# Patient Record
Sex: FEMALE | NOT HISPANIC OR LATINO | Employment: UNEMPLOYED
[De-identification: names, ages, dates, MRNs, and addresses within clinical notes are randomized per-mention and may not be internally consistent; named-entity substitution may affect disease eponyms.]

---

## 2019-09-12 ENCOUNTER — TELEPHONE (OUTPATIENT)
Dept: SCHEDULING | Age: 29
End: 2019-09-12

## 2019-09-12 ENCOUNTER — WALK IN (OUTPATIENT)
Dept: URGENT CARE | Age: 29
End: 2019-09-12

## 2019-09-12 DIAGNOSIS — J02.9 PHARYNGITIS, UNSPECIFIED ETIOLOGY: Primary | ICD-10-CM

## 2019-09-12 LAB
INTERNAL PROCEDURAL CONTROLS ACCEPTABLE: YES
S PYO AG THROAT QL IA.RAPID: NEGATIVE

## 2019-09-12 PROCEDURE — X0943 POCT RAPID STREP A: HCPCS | Performed by: NURSE PRACTITIONER

## 2019-09-12 RX ORDER — LORATADINE 10 MG/1
10 TABLET ORAL DAILY
COMMUNITY

## 2019-09-12 RX ORDER — MONTELUKAST SODIUM 10 MG/1
10 TABLET ORAL NIGHTLY
COMMUNITY

## 2019-09-12 SDOH — HEALTH STABILITY: MENTAL HEALTH: HOW OFTEN DO YOU HAVE A DRINK CONTAINING ALCOHOL?: NEVER

## 2019-09-12 ASSESSMENT — ENCOUNTER SYMPTOMS
COUGH: 1
LIGHT-HEADEDNESS: 0
WEAKNESS: 0
FEVER: 1
SHORTNESS OF BREATH: 0
TROUBLE SWALLOWING: 0
WHEEZING: 0
FATIGUE: 0
CHILLS: 0
CHEST TIGHTNESS: 0
HEADACHES: 1
SORE THROAT: 1
DIZZINESS: 0
NUMBNESS: 0

## 2019-09-12 ASSESSMENT — PAIN SCALES - GENERAL: PAINLEVEL: 7-8

## 2019-09-13 ENCOUNTER — TELEPHONE (OUTPATIENT)
Dept: SCHEDULING | Age: 29
End: 2019-09-13

## 2019-10-27 ENCOUNTER — HOSPITAL ENCOUNTER (EMERGENCY)
Facility: HOSPITAL | Age: 29
Discharge: HOME | End: 2019-10-27
Attending: EMERGENCY MEDICINE

## 2019-10-27 ENCOUNTER — APPOINTMENT (OUTPATIENT)
Dept: CT IMAGING | Facility: HOSPITAL | Age: 29
End: 2019-10-27
Attending: EMERGENCY MEDICINE

## 2019-10-27 VITALS
HEART RATE: 88 BPM | DIASTOLIC BLOOD PRESSURE: 78 MMHG | OXYGEN SATURATION: 99 % | WEIGHT: 130 LBS | BODY MASS INDEX: 26.21 KG/M2 | HEIGHT: 59 IN | SYSTOLIC BLOOD PRESSURE: 122 MMHG | RESPIRATION RATE: 16 BRPM | TEMPERATURE: 98 F

## 2019-10-27 DIAGNOSIS — R10.9 ABDOMINAL PAIN OF UNKNOWN ETIOLOGY: Primary | ICD-10-CM

## 2019-10-27 PROCEDURE — 96375 TX/PRO/DX INJ NEW DRUG ADDON: CPT

## 2019-10-27 PROCEDURE — 85025 COMPLETE CBC W/AUTO DIFF WBC: CPT | Performed by: EMERGENCY MEDICINE

## 2019-10-27 PROCEDURE — 83690 ASSAY OF LIPASE: CPT | Performed by: EMERGENCY MEDICINE

## 2019-10-27 PROCEDURE — 81025 URINE PREGNANCY TEST: CPT

## 2019-10-27 PROCEDURE — 96374 THER/PROPH/DIAG INJ IV PUSH: CPT

## 2019-10-27 PROCEDURE — 74176 CT ABD & PELVIS W/O CONTRAST: CPT | Performed by: EMERGENCY MEDICINE

## 2019-10-27 PROCEDURE — 96361 HYDRATE IV INFUSION ADD-ON: CPT

## 2019-10-27 PROCEDURE — 99284 EMERGENCY DEPT VISIT MOD MDM: CPT

## 2019-10-27 PROCEDURE — 80053 COMPREHEN METABOLIC PANEL: CPT | Performed by: EMERGENCY MEDICINE

## 2019-10-27 RX ORDER — ONDANSETRON 2 MG/ML
4 INJECTION INTRAMUSCULAR; INTRAVENOUS ONCE
Status: DISCONTINUED | OUTPATIENT
Start: 2019-10-27 | End: 2019-10-27

## 2019-10-27 RX ORDER — SODIUM CHLORIDE 9 MG/ML
INJECTION, SOLUTION INTRAVENOUS CONTINUOUS
Status: DISCONTINUED | OUTPATIENT
Start: 2019-10-27 | End: 2019-10-27

## 2019-10-27 RX ORDER — MONTELUKAST SODIUM 10 MG/1
10 TABLET ORAL NIGHTLY
COMMUNITY
End: 2020-02-05

## 2019-10-27 RX ORDER — ONDANSETRON 2 MG/ML
4 INJECTION INTRAMUSCULAR; INTRAVENOUS ONCE
Status: COMPLETED | OUTPATIENT
Start: 2019-10-27 | End: 2019-10-27

## 2019-10-27 RX ORDER — MORPHINE SULFATE 4 MG/ML
4 INJECTION, SOLUTION INTRAMUSCULAR; INTRAVENOUS EVERY 30 MIN PRN
Status: DISCONTINUED | OUTPATIENT
Start: 2019-10-27 | End: 2019-10-27

## 2019-10-27 NOTE — ED INITIAL ASSESSMENT (HPI)
27 yo F complaining of lower abdomen pain radiates to left back x 5 days. intermittent fever, Nausea, vomiting, some loose stools x 5 days.

## 2019-10-28 NOTE — ED PROVIDER NOTES
Patient Seen in: [facility] Emergency Department      History   Patient presents with:  Nausea/Vomiting/Diarrhea (gastrointestinal)  Abdomen/Flank Pain (GI/)    Stated Complaint: abd pain, + fever, yes n/v/d    HPI    Patient is a 26-year-old fema exudate. NECK: Supple, trachea midline, no lymphadenopathy. LUNG: Lungs clear to auscultation bilaterally, no wheezing, no rales, no rhonchi. CARDIOVASCULAR: Regular rate and rhythm. Normal S1S2. No S3S4 or murmur. ABDOMEN: Bowel sounds are present. 2d Rndr(no Iv,no Oral)(cpt=74176)    Result Date: 10/27/2019  PROCEDURE:  CT ABDOMEN/PELVIS KIDNEYSTONE 2D RNDR (NO IV,NO ORAL) (CPT=74176)  COMPARISON:  None.   INDICATIONS:  abd pain, + fever, yes n/v/d  TECHNIQUE:  Unenhanced multislice CT scanning from medication. Abdomen reexamined and benign. CT scan shows no evidence for appendicitis, kidney stones, ovarian cyst.  Patient will be sent home with [place] for a few days. Advised to follow with medicine as an outpatient.             Patient was screened a

## 2020-01-17 ENCOUNTER — OFFICE VISIT (OUTPATIENT)
Dept: OBGYN CLINIC | Facility: CLINIC | Age: 30
End: 2020-01-17

## 2020-01-17 ENCOUNTER — APPOINTMENT (OUTPATIENT)
Dept: LAB | Age: 30
End: 2020-01-17
Attending: NURSE PRACTITIONER
Payer: COMMERCIAL

## 2020-01-17 VITALS
HEIGHT: 59 IN | WEIGHT: 145 LBS | SYSTOLIC BLOOD PRESSURE: 114 MMHG | TEMPERATURE: 99 F | HEART RATE: 96 BPM | BODY MASS INDEX: 29.23 KG/M2 | RESPIRATION RATE: 16 BRPM | DIASTOLIC BLOOD PRESSURE: 82 MMHG

## 2020-01-17 DIAGNOSIS — R10.9 CRAMPING AFFECTING PREGNANCY, ANTEPARTUM: ICD-10-CM

## 2020-01-17 DIAGNOSIS — O26.859 SPOTTING IN EARLY PREGNANCY: Primary | ICD-10-CM

## 2020-01-17 DIAGNOSIS — O26.899 CRAMPING AFFECTING PREGNANCY, ANTEPARTUM: ICD-10-CM

## 2020-01-17 DIAGNOSIS — O26.859 SPOTTING IN EARLY PREGNANCY: ICD-10-CM

## 2020-01-17 DIAGNOSIS — N91.1 AMENORRHEA, SECONDARY: ICD-10-CM

## 2020-01-17 LAB
B-HCG SERPL-ACNC: 5661 MIU/ML
PROGEST SERPL-MCNC: 15.8 NG/ML
RH BLOOD TYPE: POSITIVE

## 2020-01-17 PROCEDURE — 86901 BLOOD TYPING SEROLOGIC RH(D): CPT

## 2020-01-17 PROCEDURE — 84144 ASSAY OF PROGESTERONE: CPT

## 2020-01-17 PROCEDURE — 84702 CHORIONIC GONADOTROPIN TEST: CPT

## 2020-01-17 PROCEDURE — 36415 COLL VENOUS BLD VENIPUNCTURE: CPT

## 2020-01-17 PROCEDURE — 86900 BLOOD TYPING SEROLOGIC ABO: CPT

## 2020-01-17 PROCEDURE — 99203 OFFICE O/P NEW LOW 30 MIN: CPT | Performed by: NURSE PRACTITIONER

## 2020-01-17 NOTE — PROGRESS NOTES
Phuong Padilla is a 26year old female. HPI:   Patient presents with: Other: newly pregnant,cramps and light pink spotting  Pt presents in early pregnancy with episode of pink spotting and mild cramping.  Reports she had her pregnancy confirmed at her P perineum, no hemorrhoids  EXTREMITIES: nontender without edema      ASSESSMENT/PLAN:   Assessment     1. Spotting in early pregnancy    2. Cramping affecting pregnancy, antepartum    3.  Amenorrhea, secondary      Pt advised of s/s of SAB and reassured deb

## 2020-01-17 NOTE — PROGRESS NOTES
Newly pregnant. Seen pcp and confirmed. Has had some periodic pink spotting and cramping. States had sexual intercourse 2 days prior. . Pcp did a urine culture for blood in urine.

## 2020-01-20 DIAGNOSIS — O26.899 CRAMPING AFFECTING PREGNANCY, ANTEPARTUM: ICD-10-CM

## 2020-01-20 DIAGNOSIS — R10.9 CRAMPING AFFECTING PREGNANCY, ANTEPARTUM: ICD-10-CM

## 2020-01-20 DIAGNOSIS — O26.859 SPOTTING IN EARLY PREGNANCY: Primary | ICD-10-CM

## 2020-01-21 ENCOUNTER — APPOINTMENT (OUTPATIENT)
Dept: LAB | Age: 30
End: 2020-01-21
Attending: NURSE PRACTITIONER
Payer: COMMERCIAL

## 2020-01-21 DIAGNOSIS — O26.899 CRAMPING AFFECTING PREGNANCY, ANTEPARTUM: ICD-10-CM

## 2020-01-21 DIAGNOSIS — R10.9 CRAMPING AFFECTING PREGNANCY, ANTEPARTUM: ICD-10-CM

## 2020-01-21 DIAGNOSIS — O26.859 SPOTTING IN EARLY PREGNANCY: ICD-10-CM

## 2020-01-21 LAB
B-HCG SERPL-ACNC: ABNORMAL MIU/ML
PROGEST SERPL-MCNC: 17.6 NG/ML

## 2020-01-21 PROCEDURE — 84144 ASSAY OF PROGESTERONE: CPT

## 2020-01-21 PROCEDURE — 36415 COLL VENOUS BLD VENIPUNCTURE: CPT

## 2020-01-21 PROCEDURE — 84702 CHORIONIC GONADOTROPIN TEST: CPT

## 2020-01-22 ENCOUNTER — TELEPHONE (OUTPATIENT)
Dept: OBGYN CLINIC | Facility: CLINIC | Age: 30
End: 2020-01-22

## 2020-01-22 NOTE — TELEPHONE ENCOUNTER
Please review dietary modifications (both food choices and grazing throughout the day) to help with nausea, using avel in any form, sour or hard candies, or can try non-drowsy Dramamine that contains avel only. Ensure she is taking Vit.  B6 50 mg TID w

## 2020-01-22 NOTE — TELEPHONE ENCOUNTER
PC with patient. States nausea and emesis after each time she has something. Discussed eating small meals and eating more often. DIscussed dry foods avoiding spice/ acidic foods. Try anything with avel in it.  She has tried avel tea and candies without rel

## 2020-01-22 NOTE — TELEPHONE ENCOUNTER
Patient is currently pregnant with LMP 12/13/19, c/o nausea and vomiting. She has tried over the counter medications without relief. Will check with KRISTIAN Alvares NP and call patient back

## 2020-01-24 ENCOUNTER — TELEPHONE (OUTPATIENT)
Dept: OBGYN CLINIC | Facility: CLINIC | Age: 30
End: 2020-01-24

## 2020-01-24 ENCOUNTER — HOSPITAL ENCOUNTER (OUTPATIENT)
Age: 30
Discharge: HOME | End: 2020-01-24
Attending: FAMILY MEDICINE
Payer: COMMERCIAL

## 2020-01-24 VITALS
TEMPERATURE: 99 F | RESPIRATION RATE: 16 BRPM | DIASTOLIC BLOOD PRESSURE: 75 MMHG | SYSTOLIC BLOOD PRESSURE: 107 MMHG | OXYGEN SATURATION: 98 % | HEART RATE: 90 BPM

## 2020-01-24 DIAGNOSIS — O21.9 NAUSEA AND VOMITING IN PREGNANCY: Primary | ICD-10-CM

## 2020-01-24 LAB
POCT BILIRUBIN URINE: NEGATIVE
POCT GLUCOSE URINE: NEGATIVE MG/DL
POCT KETONE URINE: NEGATIVE MG/DL
POCT LEUKOCYTE ESTERASE URINE: NEGATIVE
POCT NITRITE URINE: NEGATIVE
POCT PH URINE: 6 (ref 5–8)
POCT PROTEIN URINE: NEGATIVE MG/DL
POCT SPECIFIC GRAVITY URINE: 1.01
POCT URINE CLARITY: CLEAR
POCT URINE COLOR: YELLOW
POCT UROBILINOGEN URINE: 0.2 MG/DL

## 2020-01-24 PROCEDURE — 81002 URINALYSIS NONAUTO W/O SCOPE: CPT | Performed by: FAMILY MEDICINE

## 2020-01-24 PROCEDURE — 99215 OFFICE O/P EST HI 40 MIN: CPT

## 2020-01-24 PROCEDURE — 96374 THER/PROPH/DIAG INJ IV PUSH: CPT

## 2020-01-24 PROCEDURE — 99204 OFFICE O/P NEW MOD 45 MIN: CPT

## 2020-01-24 PROCEDURE — 96361 HYDRATE IV INFUSION ADD-ON: CPT

## 2020-01-24 RX ORDER — METOCLOPRAMIDE HYDROCHLORIDE 5 MG/ML
10 INJECTION INTRAMUSCULAR; INTRAVENOUS ONCE
Status: COMPLETED | OUTPATIENT
Start: 2020-01-24 | End: 2020-01-24

## 2020-01-24 RX ORDER — METOCLOPRAMIDE 10 MG/1
10 TABLET ORAL EVERY 8 HOURS PRN
Qty: 30 TABLET | Refills: 2 | Status: SHIPPED | OUTPATIENT
Start: 2020-01-24 | End: 2020-07-27

## 2020-01-24 RX ORDER — SODIUM CHLORIDE 9 MG/ML
1000 INJECTION, SOLUTION INTRAVENOUS ONCE
Status: COMPLETED | OUTPATIENT
Start: 2020-01-24 | End: 2020-01-24

## 2020-01-24 NOTE — ED PROVIDER NOTES
Patient Seen in: [facility]      History   Patient presents with:  Vomiting    Stated Complaint: VOMTING/FATIGUE/6 WKS PREGNANT X 3 DAYS    HPI    This 50-year-old female who is currently 6 weeks pregnant presents to the office and O times 3  HEAD: Normocephalic, atraumatic  EYES: Sclera anicteric,  conjunctiva normal.  EARS: Tympanic membranes normal, EAC's normal.  NOSE: Turbinates normal, no bleeding noted.   PHARYNX:  No eythema or exudates, airway patent, uvula midline, mucou Eat small frequent meals throughout the day. Go to the emergency room if you have worsening abdominal pain, worsening vaginal bleeding or passing clots or persistent vomiting despite taking the Reglan and cannot keep down fluids.   Keep your appointment w

## 2020-01-24 NOTE — ED INITIAL ASSESSMENT (HPI)
Vomiting -  X 3 days about 5-6 x per day. Pt  Is 6 weeks pregnant   Pt called her OB yesterday was advised to take unisom and B6 .   Again called today was advised to go to an urgent care if still vomiting, she was told to get IV fluids because she may be

## 2020-01-24 NOTE — TELEPHONE ENCOUNTER
PC with patient. Still has nausea and vomiting. Tolerating very little liquids and has tried others things that was discussed. Told to try ice chips to suck on. Dark pink to red spotting again since last evening. With wiping only. Nothing in unde wear.  Some

## 2020-01-24 NOTE — TELEPHONE ENCOUNTER
PC with patient, she stated she is still not able to keep any food down. She had spoken with a nurse on 1/22. Tried all the suggestions and still is nausea and vomiting each meal. Wanted to know what else she should do. Please advise.

## 2020-01-30 ENCOUNTER — TELEPHONE (OUTPATIENT)
Dept: OBGYN CLINIC | Facility: CLINIC | Age: 30
End: 2020-01-30

## 2020-01-30 NOTE — TELEPHONE ENCOUNTER
Spoke with pt and reviewed n/v protocol with her. She is taking Reglan every 8 hours, but reports she is still throwing up about 4 times a day. Reports she is able to urinate about 8-10 times a day and does not feel she is dehydrated.  She went to UC 6 days

## 2020-01-30 NOTE — TELEPHONE ENCOUNTER
Patient has FOB on feb 5, she is having very bad morning sickness. She has been given some medication but its not helping. She cant keep anything down.

## 2020-02-03 ENCOUNTER — TELEPHONE (OUTPATIENT)
Dept: OBGYN CLINIC | Facility: CLINIC | Age: 30
End: 2020-02-03

## 2020-02-03 NOTE — TELEPHONE ENCOUNTER
PC with patient. She states she spoke with nurse practitier last week to get a note to work from home. Newly pregnant and having nausea and vomiting and fatigue. First ob appointment is on 2/5/2020. Still has emesis. Tolerating some liquids.  To try Gatorade

## 2020-02-04 DIAGNOSIS — O36.80X0 ENCOUNTER TO DETERMINE FETAL VIABILITY OF PREGNANCY, SINGLE OR UNSPECIFIED FETUS: Primary | ICD-10-CM

## 2020-02-05 ENCOUNTER — ULTRASOUND ENCOUNTER (OUTPATIENT)
Dept: OBGYN CLINIC | Facility: CLINIC | Age: 30
End: 2020-02-05

## 2020-02-05 ENCOUNTER — INITIAL PRENATAL (OUTPATIENT)
Dept: OBGYN CLINIC | Facility: CLINIC | Age: 30
End: 2020-02-05

## 2020-02-05 ENCOUNTER — LAB ENCOUNTER (OUTPATIENT)
Dept: LAB | Age: 30
End: 2020-02-05
Attending: NURSE PRACTITIONER
Payer: COMMERCIAL

## 2020-02-05 ENCOUNTER — TELEPHONE (OUTPATIENT)
Dept: OBGYN CLINIC | Facility: CLINIC | Age: 30
End: 2020-02-05

## 2020-02-05 VITALS
HEART RATE: 76 BPM | HEIGHT: 58 IN | WEIGHT: 140 LBS | SYSTOLIC BLOOD PRESSURE: 110 MMHG | BODY MASS INDEX: 29.39 KG/M2 | DIASTOLIC BLOOD PRESSURE: 80 MMHG

## 2020-02-05 DIAGNOSIS — Z36.9 ENCOUNTER FOR ANTENATAL SCREENING: ICD-10-CM

## 2020-02-05 DIAGNOSIS — Z11.3 SCREEN FOR SEXUALLY TRANSMITTED DISEASES: ICD-10-CM

## 2020-02-05 DIAGNOSIS — Z12.4 SCREENING FOR MALIGNANT NEOPLASM OF CERVIX: ICD-10-CM

## 2020-02-05 DIAGNOSIS — O21.9 NAUSEA AND VOMITING IN PREGNANCY PRIOR TO 22 WEEKS GESTATION: ICD-10-CM

## 2020-02-05 DIAGNOSIS — Z34.01 ENCOUNTER FOR SUPERVISION OF NORMAL FIRST PREGNANCY IN FIRST TRIMESTER: Primary | ICD-10-CM

## 2020-02-05 LAB
ANTIBODY SCREEN: NEGATIVE
APPEARANCE: CLEAR
BASOPHILS # BLD AUTO: 0.05 X10(3) UL (ref 0–0.2)
BASOPHILS NFR BLD AUTO: 0.5 %
BILIRUBIN: NEGATIVE
DEPRECATED RDW RBC AUTO: 39.2 FL (ref 35.1–46.3)
EOSINOPHIL # BLD AUTO: 0.06 X10(3) UL (ref 0–0.7)
EOSINOPHIL NFR BLD AUTO: 0.5 %
ERYTHROCYTE [DISTWIDTH] IN BLOOD BY AUTOMATED COUNT: 13.2 % (ref 11–15)
GLUCOSE (URINE DIPSTICK): NEGATIVE MG/DL
HBV SURFACE AG SER-ACNC: <0.1 [IU]/L
HBV SURFACE AG SERPL QL IA: NONREACTIVE
HCT VFR BLD AUTO: 39.7 % (ref 35–48)
HGB BLD-MCNC: 12.9 G/DL (ref 12–16)
IMM GRANULOCYTES # BLD AUTO: 0.04 X10(3) UL (ref 0–1)
IMM GRANULOCYTES NFR BLD: 0.4 %
LYMPHOCYTES # BLD AUTO: 2.6 X10(3) UL (ref 1–4)
LYMPHOCYTES NFR BLD AUTO: 23.8 %
MCH RBC QN AUTO: 26.5 PG (ref 26–34)
MCHC RBC AUTO-ENTMCNC: 32.5 G/DL (ref 31–37)
MCV RBC AUTO: 81.7 FL (ref 80–100)
MONOCYTES # BLD AUTO: 0.57 X10(3) UL (ref 0.1–1)
MONOCYTES NFR BLD AUTO: 5.2 %
MULTISTIX LOT#: NORMAL NUMERIC
NEUTROPHILS # BLD AUTO: 7.61 X10 (3) UL (ref 1.5–7.7)
NEUTROPHILS # BLD AUTO: 7.61 X10(3) UL (ref 1.5–7.7)
NEUTROPHILS NFR BLD AUTO: 69.6 %
NITRITE, URINE: NEGATIVE
OCCULT BLOOD: NEGATIVE
PH, URINE: 6.5 (ref 4.5–8)
PLATELET # BLD AUTO: 435 10(3)UL (ref 150–450)
RBC # BLD AUTO: 4.86 X10(6)UL (ref 3.8–5.3)
RH BLOOD TYPE: POSITIVE
RUBV IGG SER QL: POSITIVE
RUBV IGG SER-ACNC: 204.9 IU/ML (ref 10–?)
SPECIFIC GRAVITY: 1.02 (ref 1–1.03)
T PALLIDUM AB SER QL IA: NONREACTIVE
URINE-COLOR: YELLOW
UROBILINOGEN,SEMI-QN: 1 MG/DL (ref 0–1.9)
WBC # BLD AUTO: 10.9 X10(3) UL (ref 4–11)

## 2020-02-05 PROCEDURE — 87389 HIV-1 AG W/HIV-1&-2 AB AG IA: CPT

## 2020-02-05 PROCEDURE — 87340 HEPATITIS B SURFACE AG IA: CPT

## 2020-02-05 PROCEDURE — 87491 CHLMYD TRACH DNA AMP PROBE: CPT

## 2020-02-05 PROCEDURE — 81002 URINALYSIS NONAUTO W/O SCOPE: CPT | Performed by: NURSE PRACTITIONER

## 2020-02-05 PROCEDURE — 87086 URINE CULTURE/COLONY COUNT: CPT

## 2020-02-05 PROCEDURE — 87591 N.GONORRHOEAE DNA AMP PROB: CPT

## 2020-02-05 PROCEDURE — 36415 COLL VENOUS BLD VENIPUNCTURE: CPT

## 2020-02-05 PROCEDURE — 86901 BLOOD TYPING SEROLOGIC RH(D): CPT

## 2020-02-05 PROCEDURE — 86850 RBC ANTIBODY SCREEN: CPT

## 2020-02-05 PROCEDURE — 86780 TREPONEMA PALLIDUM: CPT

## 2020-02-05 PROCEDURE — 86900 BLOOD TYPING SEROLOGIC ABO: CPT

## 2020-02-05 PROCEDURE — 85025 COMPLETE CBC W/AUTO DIFF WBC: CPT

## 2020-02-05 PROCEDURE — 86762 RUBELLA ANTIBODY: CPT

## 2020-02-05 RX ORDER — ONDANSETRON 4 MG/1
4 TABLET, ORALLY DISINTEGRATING ORAL EVERY 8 HOURS PRN
Qty: 12 TABLET | Refills: 0 | Status: SHIPPED | OUTPATIENT
Start: 2020-02-05 | End: 2020-02-08

## 2020-02-05 NOTE — PROGRESS NOTES
Here for initial prenatal visit with our group. 26year old  at 10w11d. Dating US completed today. Final GOOD of 2020 based on US. This is the patient's first pregnancy and denies h/o EAB/SAB. No PMH of STIs.  No personal or [facility] of genetic or chromos greater resolution of symptoms, will explore possible home health services for antiemetic pump/services.    RTO in 4 weeks

## 2020-02-05 NOTE — TELEPHONE ENCOUNTER
Pt would like NIPT prenatal (not carrier screen) completed at her [facility]. Pt has HMO insurance. Please place referral for this test and notify pt when it is approved.     Thank You

## 2020-02-06 LAB
C TRACH DNA SPEC QL NAA+PROBE: NEGATIVE
N GONORRHOEA DNA SPEC QL NAA+PROBE: NEGATIVE

## 2020-02-08 DIAGNOSIS — O21.9 NAUSEA AND VOMITING IN PREGNANCY PRIOR TO 22 WEEKS GESTATION: ICD-10-CM

## 2020-02-08 RX ORDER — ONDANSETRON 4 MG/1
4 TABLET, ORALLY DISINTEGRATING ORAL EVERY 8 HOURS PRN
Qty: 30 TABLET | Refills: 0 | Status: SHIPPED | OUTPATIENT
Start: 2020-02-08 | End: 2020-08-10

## 2020-02-12 ENCOUNTER — TELEPHONE (OUTPATIENT)
Dept: OBGYN CLINIC | Facility: CLINIC | Age: 30
End: 2020-02-12

## 2020-02-12 NOTE — TELEPHONE ENCOUNTER
----- Message from Fito Fermin sent at 2/12/2020  1:27 PM CST -----  Camilla Hannah with [facility] department  [phone] would like to speak with you about a ref.  For genetic testing

## 2020-02-12 NOTE — TELEPHONE ENCOUNTER
PC with Robin Solis at [facility]. Medical director wants additional information regarding why Free cell DNA testing referral is being requested.

## 2020-02-13 ENCOUNTER — TELEPHONE (OUTPATIENT)
Dept: OBGYN CLINIC | Facility: CLINIC | Age: 30
End: 2020-02-13

## 2020-02-13 ENCOUNTER — ROUTINE PRENATAL (OUTPATIENT)
Dept: OBGYN CLINIC | Facility: CLINIC | Age: 30
End: 2020-02-13

## 2020-02-13 VITALS
WEIGHT: 137 LBS | SYSTOLIC BLOOD PRESSURE: 110 MMHG | HEART RATE: 78 BPM | BODY MASS INDEX: 28.76 KG/M2 | DIASTOLIC BLOOD PRESSURE: 78 MMHG | HEIGHT: 58 IN

## 2020-02-13 DIAGNOSIS — Z36.9 ENCOUNTER FOR ANTENATAL SCREENING: ICD-10-CM

## 2020-02-13 DIAGNOSIS — Z34.01 ENCOUNTER FOR SUPERVISION OF NORMAL FIRST PREGNANCY IN FIRST TRIMESTER: ICD-10-CM

## 2020-02-13 DIAGNOSIS — O21.9 NAUSEA AND VOMITING IN PREGNANCY PRIOR TO 22 WEEKS GESTATION: Primary | ICD-10-CM

## 2020-02-13 LAB
APPEARANCE: CLEAR
GLUCOSE (URINE DIPSTICK): NEGATIVE MG/DL
KETONES (URINE DIPSTICK): 15 MG/DL
MULTISTIX LOT#: NORMAL NUMERIC
NITRITE, URINE: NEGATIVE
PH, URINE: 6 (ref 4.5–8)
PROTEIN (URINE DIPSTICK): 30 MG/DL
SPECIFIC GRAVITY: 1.02 (ref 1–1.03)
UROBILINOGEN,SEMI-QN: 1 MG/DL (ref 0–1.9)

## 2020-02-13 PROCEDURE — 81002 URINALYSIS NONAUTO W/O SCOPE: CPT | Performed by: NURSE PRACTITIONER

## 2020-02-13 PROCEDURE — 99213 OFFICE O/P EST LOW 20 MIN: CPT | Performed by: NURSE PRACTITIONER

## 2020-02-13 NOTE — TELEPHONE ENCOUNTER
PC with patient. Emesis almost all day long after eating or drinking. Able to keep ginger ale down and occasionally bread. Tolerating the prenatal vtamins. Using Zofran 3 times per day. Having moderate pain in RLQ. Comes and goes.  Lasting 10 to 15 minutes

## 2020-02-13 NOTE — TELEPHONE ENCOUNTER
Thank you for addressing her home health order. I agree with advice given. If pain is still present, she needs to be evaluated and can be given IV hydration with IV antiemetics. This might greatly help how she is feeling.      Pt does have cyst on right ova

## 2020-02-13 NOTE — TELEPHONE ENCOUNTER
Patient called back. She can not get a ride to the office. Spoke with provider in office. Since she can not come in, we recommend she be seen at an immediate care to be evaluated today. She asked even if the pain comes and goes? Told yes.  She has been havi

## 2020-02-13 NOTE — TELEPHONE ENCOUNTER
Spoke to Dr. Rama Choe and P patients below 28years years old need supporting diagnosis for NIPT. Pt does not have any pre-existing conditions nor family medical history to support need. [facility] the referral was denied.

## 2020-02-13 NOTE — PROGRESS NOTES
Zoie Painting is a 26year old female. HPI:   Pt presents with \"on and off\" pain in the RLQ. This has been happening since Monday. Pt is currently having n/v with multiple episodes every day.  She has tried and failed [employer] and [employer] • Irregular menses      No past surgical history on file.   ROS: all other systems negative unless addressed above      PHYSICAL EXAM:   GENERAL: Well developed, well nourished, in no apparent distress  ABDOMEN: Soft, non distended, non tender, GYNE/:

## 2020-02-13 NOTE — TELEPHONE ENCOUNTER
----- Message from Rekha Pablo sent at 2/13/2020 10:51 AM CST -----  Regarding: RE: Other  Contact: [phone]  I think it's a combination of pregnancy discomfort and vomiting. It's hasn't gotten worse.  Just have back pain and still nauseous and vomit

## 2020-02-14 ENCOUNTER — TELEPHONE (OUTPATIENT)
Dept: OBGYN CLINIC | Facility: CLINIC | Age: 30
End: 2020-02-14

## 2020-02-14 NOTE — TELEPHONE ENCOUNTER
PC with patient. Aware the IV hydration was approved. [facility] Told to watch for a call from them. Understands.

## 2020-02-21 NOTE — PROGRESS NOTES
Received plan of care from [address] [place] [address]. Provider signed and faxed back to [phone].

## 2020-03-03 ENCOUNTER — TELEPHONE (OUTPATIENT)
Dept: OBGYN CLINIC | Facility: CLINIC | Age: 30
End: 2020-03-03

## 2020-03-03 NOTE — TELEPHONE ENCOUNTER
An Rn with [facility] called and states pt is not doing well with her current Zofran pump dose. However, pt is experiencing constipation and therefore they cannot increase her dose.  She is currently consuming about 10% of her expected diet and 25% of her

## 2020-03-04 ENCOUNTER — TELEPHONE (OUTPATIENT)
Dept: OBGYN CLINIC | Facility: CLINIC | Age: 30
End: 2020-03-04

## 2020-03-04 ENCOUNTER — ROUTINE PRENATAL (OUTPATIENT)
Dept: OBGYN CLINIC | Facility: CLINIC | Age: 30
End: 2020-03-04

## 2020-03-04 VITALS
BODY MASS INDEX: 29.18 KG/M2 | DIASTOLIC BLOOD PRESSURE: 80 MMHG | HEIGHT: 58 IN | WEIGHT: 139 LBS | HEART RATE: 84 BPM | SYSTOLIC BLOOD PRESSURE: 120 MMHG

## 2020-03-04 DIAGNOSIS — Z36.9 ANTENATAL SCREENING ENCOUNTER: ICD-10-CM

## 2020-03-04 DIAGNOSIS — Z34.01 ENCOUNTER FOR SUPERVISION OF NORMAL FIRST PREGNANCY IN FIRST TRIMESTER: Primary | ICD-10-CM

## 2020-03-04 DIAGNOSIS — O21.9 NAUSEA AND VOMITING IN PREGNANCY PRIOR TO 22 WEEKS GESTATION: ICD-10-CM

## 2020-03-04 DIAGNOSIS — O99.611 CONSTIPATION DURING PREGNANCY IN FIRST TRIMESTER: ICD-10-CM

## 2020-03-04 DIAGNOSIS — K59.00 CONSTIPATION DURING PREGNANCY IN FIRST TRIMESTER: ICD-10-CM

## 2020-03-04 LAB
AMB EXT MYRIAD TRISOMY 13: NEGATIVE
AMB EXT MYRIAD TRISOMY 18: NEGATIVE
AMB EXT MYRIAD TRISOMY 21: NEGATIVE
GLUCOSE (URINE DIPSTICK): NEGATIVE MG/DL
MULTISTIX LOT#: NORMAL NUMERIC
PROTEIN (URINE DIPSTICK): NEGATIVE MG/DL

## 2020-03-04 PROCEDURE — 81002 URINALYSIS NONAUTO W/O SCOPE: CPT | Performed by: NURSE PRACTITIONER

## 2020-03-04 RX ORDER — METOCLOPRAMIDE 10 MG/1
10 TABLET ORAL EVERY 8 HOURS
Qty: 90 TABLET | Refills: 0 | Status: SHIPPED | OUTPATIENT
Start: 2020-03-04

## 2020-03-04 RX ORDER — ONDANSETRON 4 MG/1
4 TABLET, FILM COATED ORAL EVERY 8 HOURS
Qty: 90 TABLET | Refills: 0 | Status: SHIPPED | OUTPATIENT
Start: 2020-03-04 | End: 2020-07-27

## 2020-03-04 RX ORDER — MECLIZINE HYDROCHLORIDE 25 MG/1
25 TABLET ORAL DAILY
Qty: 30 TABLET | Refills: 0 | Status: SHIPPED | OUTPATIENT
Start: 2020-03-04

## 2020-03-04 NOTE — TELEPHONE ENCOUNTER
PC with patient. She was just here and forgot to get letter for work to work from home. Spoke with nurse practitioner and ok to do the letter for patient. To send through my chart.

## 2020-03-16 ENCOUNTER — PATIENT MESSAGE (OUTPATIENT)
Dept: OBGYN CLINIC | Facility: CLINIC | Age: 30
End: 2020-03-16

## 2020-03-23 ENCOUNTER — TELEPHONE (OUTPATIENT)
Dept: OBGYN CLINIC | Facility: CLINIC | Age: 30
End: 2020-03-23

## 2020-03-23 NOTE — TELEPHONE ENCOUNTER
PC with patient. States on Friday got up to fast and blacked out for a few minutes,she landed on her back. Yesterday slipped on a step. Had cramping for 1/2 hour after, no bleeding then subsided. Told to decrease sugar food intake.  Increase protein, eat s

## 2020-03-23 NOTE — TELEPHONE ENCOUNTER
Patient aware [facility] prenatal screen is negative for Jfpfgf76,18, and 21. Doesn't\"t want to know fetal sex. She will  a copy.

## 2020-03-23 NOTE — TELEPHONE ENCOUNTER
[facility] Patient called stating she called the answering service on Friday  Because she fell and blacked out for a few min the on call dr told not to be concerned.

## 2020-03-26 ENCOUNTER — TELEPHONE (OUTPATIENT)
Dept: OBGYN CLINIC | Facility: CLINIC | Age: 30
End: 2020-03-26

## 2020-03-26 NOTE — TELEPHONE ENCOUNTER
When spoke with patient today she also had asked about [facility] prenatal screen results again and wanted to know the fetal sex this time. Told the results were negative for Trisomy 13, 18, and 21. Predicted fetal sex is: Female. Undertands.

## 2020-03-26 NOTE — TELEPHONE ENCOUNTER
PC with patient. Has a cold with cough. Afebrile. Light green sinus drainage. Vomiting again. Use Saline nasal spray, plenty of liquids. Warm liquids- decaf tea, avel tea, broth, or Gatorade Eat small meals and eat more often.  Plenty of rest. Robitussin for

## 2020-03-27 ENCOUNTER — TELEPHONE (OUTPATIENT)
Dept: OBGYN CLINIC | Facility: CLINIC | Age: 30
End: 2020-03-27

## 2020-03-27 NOTE — TELEPHONE ENCOUNTER
PC with patient, she stated she was seen at the hospital last night for abdominal pain. They examined her and everything was normal. She thinks it may be her cyst. She wanted to call to inform the Dr. Please advise.

## 2020-03-27 NOTE — TELEPHONE ENCOUNTER
PC with patient. Went to ED last night for severe entire abdominal pain. States was given Reglan and Benadryl. Improved but still pain in RLQ. Tender to touch and when ultrasound probe used. She states was told US was ok. Denies bleeding or fever.  Today sujatha

## 2020-03-30 NOTE — TELEPHONE ENCOUNTER
PC with patient. Ask which ED she went to. States went to [facility] 46 left there to send records from visit. Asked how she is doing? States still has congestion and cough. Try Robitussin DM, cough drops without alcohol in them, lemon boston

## 2020-04-01 ENCOUNTER — TELEPHONE (OUTPATIENT)
Dept: OBGYN CLINIC | Facility: CLINIC | Age: 30
End: 2020-04-01

## 2020-04-01 ENCOUNTER — ROUTINE PRENATAL (OUTPATIENT)
Dept: OBGYN CLINIC | Facility: CLINIC | Age: 30
End: 2020-04-01

## 2020-04-01 VITALS
DIASTOLIC BLOOD PRESSURE: 66 MMHG | HEIGHT: 58 IN | WEIGHT: 135 LBS | HEART RATE: 84 BPM | BODY MASS INDEX: 28.34 KG/M2 | SYSTOLIC BLOOD PRESSURE: 102 MMHG

## 2020-04-01 DIAGNOSIS — Z3A.15 15 WEEKS GESTATION OF PREGNANCY: ICD-10-CM

## 2020-04-01 DIAGNOSIS — Z36.89 ENCOUNTER FOR FETAL ANATOMIC SURVEY: ICD-10-CM

## 2020-04-01 DIAGNOSIS — Z34.02 ENCOUNTER FOR SUPERVISION OF NORMAL FIRST PREGNANCY IN SECOND TRIMESTER: Primary | ICD-10-CM

## 2020-04-01 PROCEDURE — 81002 URINALYSIS NONAUTO W/O SCOPE: CPT | Performed by: OBSTETRICS & GYNECOLOGY

## 2020-04-01 NOTE — TELEPHONE ENCOUNTER
PC with patient. Has appointment today and called to check on her symptoms. She states no fever, has not had a cough for 2 days. .She has some eye and sinus congestion. Has been home. Has not had any contact with anyone who is sick.  She did go to the ED las

## 2020-04-01 NOTE — PROGRESS NOTES
Patient was in the emergency room in [facility] with right lower quadrant pain. She has a small simple cysts on the right ovary. Pain is better but occasionally still feels it. Possibility of round ligament pain.   She had ultrasound done ther

## 2020-04-02 NOTE — PROGRESS NOTES
Records received from [facility] ED visit on 3/27/2020. Had blood work and ultrasound done. Seen provider for follow up on 4/1/2020.

## 2020-04-06 ENCOUNTER — TELEPHONE (OUTPATIENT)
Dept: OBGYN CLINIC | Facility: CLINIC | Age: 30
End: 2020-04-06

## 2020-04-06 NOTE — TELEPHONE ENCOUNTER
----- Message from Oneida Sorto sent at 4/6/2020  2:10 AM CDT -----  Regarding: Other  Contact: [phone]  I recently found out a member of my house hold is COVID positive. I came to the office Wednesday.

## 2020-04-06 NOTE — TELEPHONE ENCOUNTER
PC with patient. Father in law that lives with her was tested for COVID-19 last Thursday. Positive results yesterday. She states he has been in a home room isolation since he started symptoms about 14 days before testing.  They have been preforming hand wash

## 2020-04-29 ENCOUNTER — ROUTINE PRENATAL (OUTPATIENT)
Dept: OBGYN CLINIC | Facility: CLINIC | Age: 30
End: 2020-04-29

## 2020-04-29 VITALS
SYSTOLIC BLOOD PRESSURE: 100 MMHG | HEIGHT: 58 IN | BODY MASS INDEX: 28.55 KG/M2 | HEART RATE: 80 BPM | DIASTOLIC BLOOD PRESSURE: 70 MMHG | WEIGHT: 136 LBS

## 2020-04-29 DIAGNOSIS — Z34.02 ENCOUNTER FOR SUPERVISION OF NORMAL FIRST PREGNANCY IN SECOND TRIMESTER: Primary | ICD-10-CM

## 2020-04-29 DIAGNOSIS — O28.3 ABNORMAL PREGNANCY US: ICD-10-CM

## 2020-04-29 DIAGNOSIS — Z36.9 ENCOUNTER FOR ANTENATAL SCREENING OF MOTHER: ICD-10-CM

## 2020-04-29 PROCEDURE — 81002 URINALYSIS NONAUTO W/O SCOPE: CPT | Performed by: OBSTETRICS & GYNECOLOGY

## 2020-04-29 NOTE — PROGRESS NOTES
Having some sciatica on her right side. Baby is moving. To see [facility] for suboptimal anatomy ultrasound today. Return to office in 4 weeks.   Tested positive for COVID-19 7 weeks ago

## 2020-05-01 ENCOUNTER — TELEPHONE (OUTPATIENT)
Dept: PERINATAL CARE | Facility: HOSPITAL | Age: 30
End: 2020-05-01

## 2020-05-11 ENCOUNTER — OFFICE VISIT (OUTPATIENT)
Dept: PERINATAL CARE | Facility: HOSPITAL | Age: 30
End: 2020-05-11
Attending: OBSTETRICS & GYNECOLOGY
Payer: COMMERCIAL

## 2020-05-11 VITALS
BODY MASS INDEX: 28 KG/M2 | SYSTOLIC BLOOD PRESSURE: 118 MMHG | DIASTOLIC BLOOD PRESSURE: 80 MMHG | HEART RATE: 106 BPM | WEIGHT: 136 LBS

## 2020-05-11 DIAGNOSIS — O28.3 ABNORMAL PREGNANCY US: ICD-10-CM

## 2020-05-11 PROCEDURE — 99243 OFF/OP CNSLTJ NEW/EST LOW 30: CPT | Performed by: OBSTETRICS & GYNECOLOGY

## 2020-05-11 PROCEDURE — 76811 OB US DETAILED SNGL FETUS: CPT | Performed by: OBSTETRICS & GYNECOLOGY

## 2020-05-11 NOTE — PROGRESS NOTES
Indication: subopt views.   ____________________________________________________________________________  History: Age: 26 years.  : 1 Para: 0.  ____________________________________________________________________________  Dating:  LMP: 2019 normal.    ____________________________________________________________________________  Maternal Structures:  Cervical length 36.8 mm.  ____________________________________________________________________________  Comments:    Mert Ashton Dr. any needed care while they are sick or under investigation for the COVID-19 virus. The virus is not vertically transmitted to the fetus and does not place the fetus at risk for direct harm.   There are case reports of COVID-19 in pregnancy and reports co dates     3.   No ultrasound evidence of structural abnormalities are seen   4.  ?  h/o (+) COVID -19 test but no records to confirm    Recommendations:    1.  continue routine care          Thank you for allowing me to participate in the care of your patie

## 2020-05-13 ENCOUNTER — TELEPHONE (OUTPATIENT)
Dept: OBGYN CLINIC | Facility: CLINIC | Age: 30
End: 2020-05-13

## 2020-05-13 NOTE — TELEPHONE ENCOUNTER
PC with patient. Earlier today was lightheaded and passed out for a few seconds. Taken by ambulance to hospital. Evaluated and given IV fluid. To follow up with provider. Asked if she had eaten prior to episode?  States yes, had cereal. States past 3 days d

## 2020-05-13 NOTE — TELEPHONE ENCOUNTER
----- Message from Oneida Sorto sent at 5/13/2020  1:32 PM CDT -----  Regarding: Other  Contact: [phone]  I just wanted to let your office know I fainted today and was brought to the hospital. They said everyone was fine with the baby but to follow

## 2020-05-15 ENCOUNTER — ROUTINE PRENATAL (OUTPATIENT)
Dept: OBGYN CLINIC | Facility: CLINIC | Age: 30
End: 2020-05-15

## 2020-05-15 VITALS
DIASTOLIC BLOOD PRESSURE: 64 MMHG | WEIGHT: 141 LBS | BODY MASS INDEX: 29.6 KG/M2 | HEART RATE: 88 BPM | HEIGHT: 58 IN | SYSTOLIC BLOOD PRESSURE: 110 MMHG

## 2020-05-15 DIAGNOSIS — Z36.9 ENCOUNTER FOR ANTENATAL SCREENING OF MOTHER: ICD-10-CM

## 2020-05-15 DIAGNOSIS — R55 FAINTING SPELL: Primary | ICD-10-CM

## 2020-05-15 PROCEDURE — 99213 OFFICE O/P EST LOW 20 MIN: CPT | Performed by: OBSTETRICS & GYNECOLOGY

## 2020-05-15 PROCEDURE — 81002 URINALYSIS NONAUTO W/O SCOPE: CPT | Performed by: OBSTETRICS & GYNECOLOGY

## 2020-05-15 NOTE — PROGRESS NOTES
Still experiencing nausea . Pt hit her head after she fell. She is still experiencing dizziness and lightheadedness.

## 2020-05-15 NOTE — PROGRESS NOTES
HPI:    Patient ID: Martha Mac is a 26year old female. HPI  Patient is 22 weeks pregnant and was seen in [facility] on 5/13/2020 after fainting at home. This was witnessed by her family. She became dizzy and nauseous and passed out.   He Dispersible Take 1 tablet (4 mg total) by mouth every 8 (eight) hours as needed for Nausea. 30 tablet 0   • Fluticasone Propionate  MCG/ACT Inhalation Aerosol Inhale into the lungs as needed.      • Prenatal Multivit-Min-Fe-FA (PRENATAL VITAMINS OR)

## 2020-05-20 ENCOUNTER — TELEPHONE (OUTPATIENT)
Dept: OBGYN CLINIC | Facility: CLINIC | Age: 30
End: 2020-05-20

## 2020-05-20 NOTE — TELEPHONE ENCOUNTER
----- Message from InHiro Signs sent at 5/19/2020  7:47 PM CDT -----  Regarding: Other  Contact: [phone]  I am still feeling nauseous and throwing up, at this point it's water as well. I have been drinking water, sprite, vitamin water.  What should I
PC to patient. She sent an e-mail about nausea and emesis. States for the past week the nausea and emesis has returned. Keeping small amount liquids down, water or sprite States is vomiting after eating. Has had some heartburn and has used Tums or Pepto bis
PC with patient. Per provider she should follow up with pcp since she had  the 101 fever, body aches and GI issues. She states she has not been around anyone with the virus. Understands.
Discharged

## 2020-05-27 ENCOUNTER — ROUTINE PRENATAL (OUTPATIENT)
Dept: OBGYN CLINIC | Facility: CLINIC | Age: 30
End: 2020-05-27

## 2020-05-27 VITALS
SYSTOLIC BLOOD PRESSURE: 98 MMHG | HEIGHT: 58 IN | BODY MASS INDEX: 29.81 KG/M2 | HEART RATE: 76 BPM | WEIGHT: 142 LBS | DIASTOLIC BLOOD PRESSURE: 64 MMHG

## 2020-05-27 DIAGNOSIS — Z34.02 ENCOUNTER FOR SUPERVISION OF NORMAL FIRST PREGNANCY IN SECOND TRIMESTER: Primary | ICD-10-CM

## 2020-05-27 DIAGNOSIS — Z36.9 ENCOUNTER FOR ANTENATAL SCREENING OF MOTHER: ICD-10-CM

## 2020-05-27 PROCEDURE — 81002 URINALYSIS NONAUTO W/O SCOPE: CPT | Performed by: OBSTETRICS & GYNECOLOGY

## 2020-06-01 ENCOUNTER — NURSE ONLY (OUTPATIENT)
Dept: LAB | Facility: HOSPITAL | Age: 30
End: 2020-06-01
Attending: OBSTETRICS & GYNECOLOGY
Payer: COMMERCIAL

## 2020-06-01 DIAGNOSIS — O99.810 ABNORMAL MATERNAL GLUCOSE TOLERANCE, ANTEPARTUM: Primary | ICD-10-CM

## 2020-06-01 DIAGNOSIS — Z36.9 ENCOUNTER FOR ANTENATAL SCREENING OF MOTHER: ICD-10-CM

## 2020-06-01 PROCEDURE — 85025 COMPLETE CBC W/AUTO DIFF WBC: CPT

## 2020-06-01 PROCEDURE — 36415 COLL VENOUS BLD VENIPUNCTURE: CPT

## 2020-06-01 PROCEDURE — 82950 GLUCOSE TEST: CPT

## 2020-06-02 ENCOUNTER — ORDER TRANSCRIPTION (OUTPATIENT)
Dept: ADMINISTRATIVE | Facility: HOSPITAL | Age: 30
End: 2020-06-02

## 2020-06-02 DIAGNOSIS — Z01.818 OTHER SPECIFIED PRE-OPERATIVE EXAMINATION: Primary | ICD-10-CM

## 2020-06-02 DIAGNOSIS — Z11.59 ENCOUNTER FOR SCREENING FOR OTHER VIRAL DISEASES: ICD-10-CM

## 2020-06-11 ENCOUNTER — PATIENT MESSAGE (OUTPATIENT)
Dept: OBGYN CLINIC | Facility: CLINIC | Age: 30
End: 2020-06-11

## 2020-06-11 ENCOUNTER — LABORATORY ENCOUNTER (OUTPATIENT)
Dept: LAB | Facility: HOSPITAL | Age: 30
End: 2020-06-11
Attending: OBSTETRICS & GYNECOLOGY
Payer: COMMERCIAL

## 2020-06-11 DIAGNOSIS — O99.810 ABNORMAL MATERNAL GLUCOSE TOLERANCE, ANTEPARTUM: ICD-10-CM

## 2020-06-11 PROCEDURE — 82962 GLUCOSE BLOOD TEST: CPT

## 2020-06-12 ENCOUNTER — TELEPHONE (OUTPATIENT)
Dept: OBGYN CLINIC | Facility: CLINIC | Age: 30
End: 2020-06-12

## 2020-06-12 ENCOUNTER — TELEPHONE (OUTPATIENT)
Dept: ENDOCRINOLOGY CLINIC | Facility: CLINIC | Age: 30
End: 2020-06-12

## 2020-06-12 DIAGNOSIS — O24.419 GESTATIONAL DIABETES MELLITUS (GDM), ANTEPARTUM, GESTATIONAL DIABETES METHOD OF CONTROL UNSPECIFIED: Primary | ICD-10-CM

## 2020-06-12 DIAGNOSIS — O24.410 DIET CONTROLLED GESTATIONAL DIABETES MELLITUS (GDM) IN SECOND TRIMESTER: Primary | ICD-10-CM

## 2020-06-12 DIAGNOSIS — O99.810 ABNORMAL MATERNAL GLUCOSE TOLERANCE, ANTEPARTUM: Primary | ICD-10-CM

## 2020-06-12 RX ORDER — BLOOD-GLUCOSE METER
1 EACH MISCELLANEOUS ONCE
Qty: 1 KIT | Refills: 0 | Status: SHIPPED | OUTPATIENT
Start: 2020-06-12 | End: 2020-06-12

## 2020-06-12 RX ORDER — BLOOD SUGAR DIAGNOSTIC
1 STRIP MISCELLANEOUS 4 TIMES DAILY
Qty: 150 EACH | Refills: 3 | Status: SHIPPED | OUTPATIENT
Start: 2020-06-12

## 2020-06-12 NOTE — TELEPHONE ENCOUNTER
From: Phuong Padilla  To: Eduin Medina MD  Sent: 6/11/2020 12:11 PM CDT  Subject: Non-Urgent Medical Question    Hello,    I called the office to find out your closed today.  I just wanted to let the office know I didn't complete the 3 hr glucose test due to

## 2020-06-12 NOTE — TELEPHONE ENCOUNTER
PC with patient. She prefers to try the 3 hour gtt again. If unable to do, she will get GDM counseling.

## 2020-06-12 NOTE — TELEPHONE ENCOUNTER
PC with patient. She has decided she does not want to do the 3 hour gtt again worry about emesis again. She will do the GDM education Told she does need a referral. I will get that started for her.  Phone number for diabetes education given

## 2020-06-12 NOTE — TELEPHONE ENCOUNTER
PC with patient. Went for 3 hour gtt. Fasting was done and drank the glucose. Close to the 1 hour draw she had emesis. Was sent home. States she did take a Reglan prior to going for the test. What to do next?

## 2020-06-15 ENCOUNTER — TELEPHONE (OUTPATIENT)
Dept: ENDOCRINOLOGY CLINIC | Facility: CLINIC | Age: 30
End: 2020-06-15

## 2020-06-15 DIAGNOSIS — O24.410 DIET CONTROLLED GESTATIONAL DIABETES MELLITUS (GDM) IN SECOND TRIMESTER: Primary | ICD-10-CM

## 2020-06-15 RX ORDER — BLOOD-GLUCOSE METER
1 EACH MISCELLANEOUS ONCE
Qty: 1 KIT | Refills: 0 | Status: SHIPPED | OUTPATIENT
Start: 2020-06-15 | End: 2020-06-15

## 2020-06-19 ENCOUNTER — TELEMEDICINE (OUTPATIENT)
Dept: ENDOCRINOLOGY CLINIC | Facility: CLINIC | Age: 30
End: 2020-06-19

## 2020-06-19 DIAGNOSIS — O24.410 DIET CONTROLLED GESTATIONAL DIABETES MELLITUS (GDM) IN SECOND TRIMESTER: Primary | ICD-10-CM

## 2020-06-19 PROCEDURE — 97802 MEDICAL NUTRITION INDIV IN: CPT | Performed by: DIETITIAN, REGISTERED

## 2020-06-19 NOTE — PROGRESS NOTES
Viridiana Alcantar   1990 was seen for Gestational Diabetes Counseling: Individual     Date: 2020  Start time: 2:45 End time: 3:30    : 1  Para: 0  EDC: 2020    Due to COVID-19 ACTION PLAN, the patient's office visit was conducted via [place] [id number] glucose testing on: Katie Contour - pt had already picked up BG meter and started testing. FBS's 80-97 max and 2 hours post B/L mostly 80's-90's.  Will test 2 hours from start of meal (vs finish) and will add 2 hours post-Dinner  Discussed monitoring keton

## 2020-06-22 ENCOUNTER — TELEPHONE (OUTPATIENT)
Dept: OBGYN CLINIC | Facility: CLINIC | Age: 30
End: 2020-06-22

## 2020-06-22 NOTE — TELEPHONE ENCOUNTER
Patient is 27 weeks pregnant, and had to cancel her July 1st visit. She is in [place] with her father who is hospitalized. Can she do a phone visit?

## 2020-06-22 NOTE — TELEPHONE ENCOUNTER
PC with patient. She is in [place]. Her father has been hospitalized and she has not been able to see him. . They are waiting to see when he will be released. She cancelled her aurora for 7/1/2020. Wants to do a phone visit.    Per protocol, phone visit can not

## 2020-07-03 ENCOUNTER — TELEMEDICINE (OUTPATIENT)
Dept: ENDOCRINOLOGY CLINIC | Facility: CLINIC | Age: 30
End: 2020-07-03

## 2020-07-03 DIAGNOSIS — O24.410 DIET CONTROLLED GESTATIONAL DIABETES MELLITUS (GDM) IN SECOND TRIMESTER: Primary | ICD-10-CM

## 2020-07-03 PROCEDURE — 97803 MED NUTRITION INDIV SUBSEQ: CPT | Performed by: DIETITIAN, REGISTERED

## 2020-07-03 NOTE — PROGRESS NOTES
Andrea Queen  9/28/1990 was seen for Gestational Diabetes Education Follow-Up    7/3/2020  Start time: 1:30 End time: 1:45  Referring Provider: Irma Song    Due to [address], the patient's office visit was conducted via video.      The patient

## 2020-07-17 ENCOUNTER — ROUTINE PRENATAL (OUTPATIENT)
Dept: OBGYN CLINIC | Facility: CLINIC | Age: 30
End: 2020-07-17

## 2020-07-17 VITALS
HEART RATE: 72 BPM | BODY MASS INDEX: 33.37 KG/M2 | WEIGHT: 159 LBS | HEIGHT: 58 IN | SYSTOLIC BLOOD PRESSURE: 118 MMHG | DIASTOLIC BLOOD PRESSURE: 76 MMHG

## 2020-07-17 DIAGNOSIS — Z34.03 ENCOUNTER FOR SUPERVISION OF NORMAL FIRST PREGNANCY IN THIRD TRIMESTER: Primary | ICD-10-CM

## 2020-07-17 DIAGNOSIS — Z36.89 ENCOUNTER FOR ULTRASOUND TO ASSESS INTERVAL GROWTH OF FETUS: ICD-10-CM

## 2020-07-17 DIAGNOSIS — Z36.9 ENCOUNTER FOR ANTENATAL SCREENING OF MOTHER: ICD-10-CM

## 2020-07-17 LAB — MULTISTIX LOT#: NORMAL NUMERIC

## 2020-07-17 PROCEDURE — 3008F BODY MASS INDEX DOCD: CPT | Performed by: OBSTETRICS & GYNECOLOGY

## 2020-07-17 PROCEDURE — 3078F DIAST BP <80 MM HG: CPT | Performed by: OBSTETRICS & GYNECOLOGY

## 2020-07-17 PROCEDURE — 81002 URINALYSIS NONAUTO W/O SCOPE: CPT | Performed by: OBSTETRICS & GYNECOLOGY

## 2020-07-17 PROCEDURE — 3074F SYST BP LT 130 MM HG: CPT | Performed by: OBSTETRICS & GYNECOLOGY

## 2020-07-17 NOTE — PROGRESS NOTES
Glucose within normal limits. Wants to hold off on the Tdap. OB ultrasound next appointment. Return to office in 2 weeks. Baby is active. Having some muscle spasm on the right lower abdomen.   Having some heartburn and taking Tums helping

## 2020-07-22 ENCOUNTER — TELEPHONE (OUTPATIENT)
Dept: OBGYN CLINIC | Facility: CLINIC | Age: 30
End: 2020-07-22

## 2020-07-22 NOTE — TELEPHONE ENCOUNTER
Patient is having heart burn which was discussed at last visit, she would like a prescription called in to her pharmacy for that. Also she is wondering if protein drinks are safe while pregnant.

## 2020-07-23 ENCOUNTER — TELEPHONE (OUTPATIENT)
Dept: OBGYN CLINIC | Facility: CLINIC | Age: 30
End: 2020-07-23

## 2020-07-23 RX ORDER — PANTOPRAZOLE SODIUM 40 MG/1
40 TABLET, DELAYED RELEASE ORAL DAILY
Qty: 30 TABLET | Refills: 1 | Status: SHIPPED | OUTPATIENT
Start: 2020-07-23

## 2020-07-23 NOTE — TELEPHONE ENCOUNTER
PC with patient. Having heartburn all day. Tums not helping. Had discussed a medication with provider at visit. She would like to try. With the heartburn her appetite is less and getting some nausea again. Asking about protein drinks?  Told she should try t

## 2020-07-23 NOTE — TELEPHONE ENCOUNTER
PT would like the medication for heartburn that her and Dr. Yuly Victor had discussed. Also has a question about protein shakes.   Please advise

## 2020-07-27 ENCOUNTER — ULTRASOUND ENCOUNTER (OUTPATIENT)
Dept: OBGYN CLINIC | Facility: CLINIC | Age: 30
End: 2020-07-27
Payer: MEDICAID

## 2020-07-27 ENCOUNTER — ROUTINE PRENATAL (OUTPATIENT)
Dept: OBGYN CLINIC | Facility: CLINIC | Age: 30
End: 2020-07-27

## 2020-07-27 VITALS
HEART RATE: 76 BPM | HEIGHT: 59 IN | DIASTOLIC BLOOD PRESSURE: 74 MMHG | BODY MASS INDEX: 30.84 KG/M2 | WEIGHT: 153 LBS | SYSTOLIC BLOOD PRESSURE: 122 MMHG

## 2020-07-27 DIAGNOSIS — Z36.9 ENCOUNTER FOR ANTENATAL SCREENING OF MOTHER: ICD-10-CM

## 2020-07-27 DIAGNOSIS — O24.410 DIET CONTROLLED GESTATIONAL DIABETES MELLITUS (GDM) IN THIRD TRIMESTER: Primary | ICD-10-CM

## 2020-07-27 LAB — MULTISTIX LOT#: NORMAL NUMERIC

## 2020-07-27 PROCEDURE — 3074F SYST BP LT 130 MM HG: CPT | Performed by: OBSTETRICS & GYNECOLOGY

## 2020-07-27 PROCEDURE — 3008F BODY MASS INDEX DOCD: CPT | Performed by: OBSTETRICS & GYNECOLOGY

## 2020-07-27 PROCEDURE — 3078F DIAST BP <80 MM HG: CPT | Performed by: OBSTETRICS & GYNECOLOGY

## 2020-07-27 PROCEDURE — 90471 IMMUNIZATION ADMIN: CPT | Performed by: OBSTETRICS & GYNECOLOGY

## 2020-07-27 PROCEDURE — 90715 TDAP VACCINE 7 YRS/> IM: CPT | Performed by: OBSTETRICS & GYNECOLOGY

## 2020-07-27 PROCEDURE — 81002 URINALYSIS NONAUTO W/O SCOPE: CPT | Performed by: OBSTETRICS & GYNECOLOGY

## 2020-07-27 NOTE — PROGRESS NOTES
GDM . Glucose well controlled. Heartburn better. 159 # weight may not be accurate. Having some nausea but not vomiting. Lack of appetite due to nausea. Return to office in 2 weeks.   Will watch weight

## 2020-08-10 ENCOUNTER — ROUTINE PRENATAL (OUTPATIENT)
Dept: OBGYN CLINIC | Facility: CLINIC | Age: 30
End: 2020-08-10
Payer: MEDICAID

## 2020-08-10 VITALS
BODY MASS INDEX: 31.04 KG/M2 | DIASTOLIC BLOOD PRESSURE: 84 MMHG | WEIGHT: 154 LBS | SYSTOLIC BLOOD PRESSURE: 122 MMHG | HEART RATE: 84 BPM | HEIGHT: 59 IN

## 2020-08-10 DIAGNOSIS — Z36.9 ENCOUNTER FOR ANTENATAL SCREENING OF MOTHER: ICD-10-CM

## 2020-08-10 DIAGNOSIS — O24.410 DIET CONTROLLED GESTATIONAL DIABETES MELLITUS (GDM) IN THIRD TRIMESTER: Primary | ICD-10-CM

## 2020-08-10 DIAGNOSIS — O28.8 AFI (AMNIOTIC FLUID INDEX) BORDERLINE LOW: ICD-10-CM

## 2020-08-10 PROCEDURE — 3074F SYST BP LT 130 MM HG: CPT | Performed by: OBSTETRICS & GYNECOLOGY

## 2020-08-10 PROCEDURE — 3008F BODY MASS INDEX DOCD: CPT | Performed by: OBSTETRICS & GYNECOLOGY

## 2020-08-10 PROCEDURE — 3079F DIAST BP 80-89 MM HG: CPT | Performed by: OBSTETRICS & GYNECOLOGY

## 2020-08-10 NOTE — PROGRESS NOTES
GDM.  GONZALO was borderline with the last ultrasound. Will repeat ultrasound with next appointment. Return to office in 2 weeks.   NST weekly then

## 2020-08-15 ENCOUNTER — TELEPHONE (OUTPATIENT)
Dept: OBGYN CLINIC | Facility: CLINIC | Age: 30
End: 2020-08-15

## 2020-08-15 NOTE — TELEPHONE ENCOUNTER
Received fax from [place] [phone] pumps for breast pump. Order signed by provider and faxed back.

## 2020-08-17 ENCOUNTER — TELEPHONE (OUTPATIENT)
Dept: OBGYN CLINIC | Facility: CLINIC | Age: 30
End: 2020-08-17

## 2020-08-17 NOTE — TELEPHONE ENCOUNTER
PC with patient. Aware an order was faxed to [facility] breast pumps on 8/15/2020. She should contact them to make sure it was received. Undertands.

## 2020-08-21 ENCOUNTER — TELEPHONE (OUTPATIENT)
Dept: OBGYN CLINIC | Facility: CLINIC | Age: 30
End: 2020-08-21

## 2020-08-21 NOTE — TELEPHONE ENCOUNTER
PC with patient. Woke at 5 am to go to the bathroom. Had sharp pain in right lower back into back of thigh. Stopped there. Denies redness,swelling or warm to touch in leg. Tried laying with pillow between legs but that made it feel a little worse. Difficult

## 2020-08-26 ENCOUNTER — ROUTINE PRENATAL (OUTPATIENT)
Dept: OBGYN CLINIC | Facility: CLINIC | Age: 30
End: 2020-08-26
Payer: MEDICAID

## 2020-08-26 ENCOUNTER — ULTRASOUND ENCOUNTER (OUTPATIENT)
Dept: OBGYN CLINIC | Facility: CLINIC | Age: 30
End: 2020-08-26
Payer: MEDICAID

## 2020-08-26 VITALS
BODY MASS INDEX: 31.45 KG/M2 | SYSTOLIC BLOOD PRESSURE: 124 MMHG | HEART RATE: 88 BPM | HEIGHT: 59 IN | DIASTOLIC BLOOD PRESSURE: 74 MMHG | WEIGHT: 156 LBS

## 2020-08-26 DIAGNOSIS — Z34.03 ENCOUNTER FOR SUPERVISION OF NORMAL FIRST PREGNANCY IN THIRD TRIMESTER: Primary | ICD-10-CM

## 2020-08-26 DIAGNOSIS — Z36.9 ENCOUNTER FOR ANTENATAL SCREENING OF MOTHER: ICD-10-CM

## 2020-08-26 DIAGNOSIS — O24.410 DIET CONTROLLED GESTATIONAL DIABETES MELLITUS (GDM), ANTEPARTUM: ICD-10-CM

## 2020-08-26 LAB — MULTISTIX LOT#: NORMAL NUMERIC

## 2020-08-26 PROCEDURE — 81002 URINALYSIS NONAUTO W/O SCOPE: CPT | Performed by: OBSTETRICS & GYNECOLOGY

## 2020-08-26 PROCEDURE — 87081 CULTURE SCREEN ONLY: CPT | Performed by: OBSTETRICS & GYNECOLOGY

## 2020-08-26 PROCEDURE — 59025 FETAL NON-STRESS TEST: CPT | Performed by: OBSTETRICS & GYNECOLOGY

## 2020-08-26 PROCEDURE — 3074F SYST BP LT 130 MM HG: CPT | Performed by: OBSTETRICS & GYNECOLOGY

## 2020-08-26 PROCEDURE — 3078F DIAST BP <80 MM HG: CPT | Performed by: OBSTETRICS & GYNECOLOGY

## 2020-08-26 PROCEDURE — 3008F BODY MASS INDEX DOCD: CPT | Performed by: OBSTETRICS & GYNECOLOGY

## 2020-08-26 NOTE — PROGRESS NOTES
NST reactive. Presumed GDM. Unable to do 3 hr GTT. father is in hospice in [place] and she and  planning on driving there to be with him and will plan to deliver out in [place]. Baby is active. They plan to be driving.   To stretch every 2 hour

## 2020-08-27 ENCOUNTER — LAB ENCOUNTER (OUTPATIENT)
Dept: LAB | Age: 30
End: 2020-08-27
Attending: INTERNAL MEDICINE
Payer: COMMERCIAL

## 2020-08-27 DIAGNOSIS — Z36.9 ENCOUNTER FOR ANTENATAL SCREENING OF MOTHER: ICD-10-CM

## 2020-08-27 PROCEDURE — 87389 HIV-1 AG W/HIV-1&-2 AB AG IA: CPT

## 2020-08-28 ENCOUNTER — TELEPHONE (OUTPATIENT)
Dept: OBGYN CLINIC | Facility: CLINIC | Age: 30
End: 2020-08-28

## 2020-08-28 NOTE — TELEPHONE ENCOUNTER
----- Message from Chelita Young sent at 8/28/2020 12:44 PM CDT -----  Regarding: Test Results Question  Contact: [phone]  I have a question about US OB FOLLOW UP SPECIFIC CONDITION PER [facility]US [facility] ONLY resulted on 8/26/20, 6:30 PM.    I just wanted to

## 2020-08-28 NOTE — TELEPHONE ENCOUNTER
PC with patient. Asked if she has my chart? States yes. Told we can put a note in there that she is pregnant and what her due date is and she can print it out.

## 2020-08-28 NOTE — TELEPHONE ENCOUNTER
PC with patient. She had already discussed the ultrasound with Dr Morro Eastman. Asked if she had any other questions? She said no.

## 2020-08-29 ENCOUNTER — TELEPHONE (OUTPATIENT)
Dept: OBGYN CLINIC | Facility: CLINIC | Age: 30
End: 2020-08-29

## 2020-08-29 NOTE — TELEPHONE ENCOUNTER
PC with patient. She is living in [place] now. Does not have a new provider yet. Last evening had moderate to severe pain across upper abdomen. Lasted for an hour. Occurred again this morning. Since she has moved to [place] we can not evaluate her.  She n

## 2021-05-26 VITALS
OXYGEN SATURATION: 98 % | WEIGHT: 135 LBS | DIASTOLIC BLOOD PRESSURE: 72 MMHG | HEART RATE: 100 BPM | RESPIRATION RATE: 18 BRPM | TEMPERATURE: 98.8 F | BODY MASS INDEX: 27.21 KG/M2 | HEIGHT: 59 IN | SYSTOLIC BLOOD PRESSURE: 122 MMHG

## 2021-11-04 ENCOUNTER — HOSPITAL ENCOUNTER (OUTPATIENT)
Age: 31
Discharge: HOME | End: 2021-11-04
Attending: EMERGENCY MEDICINE
Payer: COMMERCIAL

## 2021-11-04 ENCOUNTER — APPOINTMENT (OUTPATIENT)
Dept: CT IMAGING | Age: 31
End: 2021-11-04
Attending: EMERGENCY MEDICINE
Payer: COMMERCIAL

## 2021-11-04 VITALS
WEIGHT: 130 LBS | DIASTOLIC BLOOD PRESSURE: 76 MMHG | BODY MASS INDEX: 25.52 KG/M2 | HEIGHT: 60 IN | OXYGEN SATURATION: 100 % | TEMPERATURE: 97 F | RESPIRATION RATE: 18 BRPM | SYSTOLIC BLOOD PRESSURE: 111 MMHG | HEART RATE: 74 BPM

## 2021-11-04 DIAGNOSIS — R10.30 LOWER ABDOMINAL PAIN: Primary | ICD-10-CM

## 2021-11-04 PROCEDURE — 81025 URINE PREGNANCY TEST: CPT

## 2021-11-04 PROCEDURE — 99213 OFFICE O/P EST LOW 20 MIN: CPT

## 2021-11-04 PROCEDURE — 99214 OFFICE O/P EST MOD 30 MIN: CPT

## 2021-11-04 PROCEDURE — 74176 CT ABD & PELVIS W/O CONTRAST: CPT | Performed by: EMERGENCY MEDICINE

## 2021-11-04 PROCEDURE — 81002 URINALYSIS NONAUTO W/O SCOPE: CPT | Performed by: EMERGENCY MEDICINE

## 2021-11-04 NOTE — ED INITIAL ASSESSMENT (HPI)
Lower abdominal pain -started today  930 am. With nausea but no vomiting. She states she felt lightheaded. Denies Fever. No otc meds taken for pain .

## 2021-11-05 NOTE — ED PROVIDER NOTES
Patient Seen in: [facility]      History   Patient presents with:  Abdominal Pain    Stated Complaint: Lower Abdominal Pain going toward back    Subjective:   HPI    79-year-old woman who comes emergency department with onset of lower abd DIPSTICK - Abnormal; Notable for the following components:       Result Value    Urine Clarity Slightly cloudy (*)     Blood, Urine Small (*)     All other components within normal limits   POCT PREGNANCY URINE - Normal          CT APPENDIX ABD/PEL WO CONT Normal appearance of the uterus. Bilateral ovarian follicle suggested, the largest follicle suggested involving the left ovary measuring 1.9 cm. No free fluid or inflammatory changes within the pelvis are identified. BONES:  No bony lesion or fracture.

## 2021-12-14 NOTE — ED NOTES
Patient called asking for her office visit information to be faxed to her [facility] in [address] [place] [address]. She is currently there but does not have the fax number. Contacted the office and will fax to [phone].

## 2022-01-08 ENCOUNTER — HOSPITAL ENCOUNTER (OUTPATIENT)
Age: 32
Discharge: HOME | End: 2022-01-08
Payer: COMMERCIAL

## 2022-01-08 VITALS
HEIGHT: 59 IN | SYSTOLIC BLOOD PRESSURE: 145 MMHG | RESPIRATION RATE: 18 BRPM | OXYGEN SATURATION: 98 % | HEART RATE: 108 BPM | TEMPERATURE: 99 F | WEIGHT: 139 LBS | BODY MASS INDEX: 28.02 KG/M2 | DIASTOLIC BLOOD PRESSURE: 93 MMHG

## 2022-01-08 DIAGNOSIS — Z20.822 SUSPECTED COVID-19 VIRUS INFECTION: Primary | ICD-10-CM

## 2022-01-08 LAB — SARS-COV-2 RNA RESP QL NAA+PROBE: NOT DETECTED

## 2022-01-08 PROCEDURE — 99213 OFFICE O/P EST LOW 20 MIN: CPT

## 2022-01-08 NOTE — ED PROVIDER NOTES
Patient Seen in: [facility]      History   Patient presents with:  Sore Throat: exposed to Covid 5 days ago  Cough/URI: chest congestion    Stated Complaint: sore throat congestion      Subjective:   HPI  Patient is a 79-year-old female p ill-appearing, toxic-appearing or diaphoretic. HENT:      Mouth/Throat:      Mouth: Mucous membranes are moist. No oral lesions. Pharynx: Oropharynx is clear. Uvula midline.  No pharyngeal swelling, oropharyngeal exudate or posterior oropharyngeal er

## 2022-01-08 NOTE — ED INITIAL ASSESSMENT (HPI)
Patient was exposed to covid 5 days ago. Has sore throat and chest congestion. Vaccinated without booster.

## 2022-09-16 LAB
AMB EXT ANTIBODY SCREEN: NEGATIVE
AMB EXT CHLAMYDIA NUCLEIC ACID AMP, TMA: NEGATIVE
AMB EXT GONOCOCCUS, NUCLEIC ACID AMP TMA: NEGATIVE
AMB EXT HBSAG SCREEN: NON REACTIVE
AMB EXT HEMATOCRIT: 39.8
AMB EXT HEMOGLOBIN: 12.7
AMB EXT HEPATITIS C VIRUS ANTIBODY: NON REACTIVE
AMB EXT HIV AG AB COMBO: NON REACTIVE
AMB EXT HPV: NEGATIVE
AMB EXT MCV: 85
AMB EXT PLATELETS: 447
AMB EXT RPR: NON REACTIVE
AMB EXT THINPREP PAP: NEGATIVE

## 2022-10-14 ENCOUNTER — HOSPITAL ENCOUNTER (OUTPATIENT)
Dept: LAB | Age: 32
End: 2022-10-14
Attending: OBSTETRICS & GYNECOLOGY

## 2022-10-21 ENCOUNTER — HOSPITAL ENCOUNTER (OUTPATIENT)
Dept: LAB | Age: 32
Discharge: HOME | End: 2022-10-21
Attending: OBSTETRICS & GYNECOLOGY

## 2022-10-21 DIAGNOSIS — Z34.81 ENCOUNTER FOR SUPERVISION OF OTHER NORMAL PREGNANCY, FIRST TRIMESTER: Primary | ICD-10-CM

## 2022-10-21 LAB — BKR KIT TESTING DISCLAIMER STATEMENT: NORMAL

## 2022-10-21 PROCEDURE — 36415 COLL VENOUS BLD VENIPUNCTURE: CPT | Performed by: INTERNAL MEDICINE

## 2022-11-11 ENCOUNTER — TELEPHONE (OUTPATIENT)
Facility: CLINIC | Age: 32
End: 2022-11-11

## 2022-11-11 LAB
HEMOGLOBIN A1C: 5.3
VITAMIN D, 25-HYDROXY: 21.9 NG/ML

## 2022-11-11 NOTE — TELEPHONE ENCOUNTER
Medical records received from [address] [place] [address], abstracted and placed in Dr. Estuardo Espinoza mail box for review

## 2022-11-11 NOTE — PROGRESS NOTES
09/16/22  Ultrasound  GA=7w5d  LGN=012 bpm  Single fetal pole an YS seen  Right ovary simple cyst 2.7 x 3 x 2.9cm    10/14/22  Ultrasound  GA = 12w0d  FHT = 159 bpm  Normal NT 1.0mm, nasal bone present  Right ovary simple cyst 2.7 x 2.5 x 2.5cm

## 2022-11-14 ENCOUNTER — INITIAL PRENATAL (OUTPATIENT)
Facility: CLINIC | Age: 32
End: 2022-11-14
Payer: COMMERCIAL

## 2022-11-14 VITALS
BODY MASS INDEX: 29.03 KG/M2 | SYSTOLIC BLOOD PRESSURE: 118 MMHG | HEIGHT: 59 IN | DIASTOLIC BLOOD PRESSURE: 62 MMHG | HEART RATE: 106 BPM | WEIGHT: 144 LBS

## 2022-11-14 DIAGNOSIS — O09.299: ICD-10-CM

## 2022-11-14 DIAGNOSIS — O09.299 HX OF PREECLAMPSIA, PRIOR PREGNANCY, CURRENTLY PREGNANT, UNSPECIFIED TRIMESTER: ICD-10-CM

## 2022-11-14 DIAGNOSIS — Z34.82 PRENATAL CARE, SUBSEQUENT PREGNANCY, SECOND TRIMESTER: Primary | ICD-10-CM

## 2022-11-14 LAB
GLUCOSE (URINE DIPSTICK): NEGATIVE MG/DL
MULTISTIX LOT#: ABNORMAL NUMERIC
PROTEIN (URINE DIPSTICK): 30 MG/DL

## 2022-11-14 PROCEDURE — 3078F DIAST BP <80 MM HG: CPT | Performed by: STUDENT IN AN ORGANIZED HEALTH CARE EDUCATION/TRAINING PROGRAM

## 2022-11-14 PROCEDURE — 3074F SYST BP LT 130 MM HG: CPT | Performed by: STUDENT IN AN ORGANIZED HEALTH CARE EDUCATION/TRAINING PROGRAM

## 2022-11-14 PROCEDURE — 3008F BODY MASS INDEX DOCD: CPT | Performed by: STUDENT IN AN ORGANIZED HEALTH CARE EDUCATION/TRAINING PROGRAM

## 2022-11-14 PROCEDURE — 81002 URINALYSIS NONAUTO W/O SCOPE: CPT | Performed by: STUDENT IN AN ORGANIZED HEALTH CARE EDUCATION/TRAINING PROGRAM

## 2022-11-14 RX ORDER — METOCLOPRAMIDE 10 MG/1
10 TABLET ORAL EVERY 6 HOURS PRN
Qty: 45 TABLET | Refills: 2 | Status: SHIPPED | OUTPATIENT
Start: 2022-11-14

## 2022-11-14 RX ORDER — ONDANSETRON 8 MG/1
8 TABLET, ORALLY DISINTEGRATING ORAL 3 TIMES DAILY PRN
COMMUNITY
Start: 2022-10-27

## 2022-11-14 NOTE — PROGRESS NOTES
Transfer OB - 16w1d     Pt transferring care, records reviewed  Still having bouts of nausea and vomiting that may last several days. Taking zofran, not noticing much improvement  Sciatica also acting up - declines PT    OBhx - 1x  at 35wk for preeclampsia with severe features, had elevated LFTs so possible HELLP syndrome however pt did undergo cholecystectomy soon after delivery for gallstones  PMHx - celiac disease, fibromyalgia, sciatica. Denies hx HIV, hepatitis, HSV, VTE  Psurghx - lsc shellie  Last pap 22 NILM    34year old , [address] by LMP c/w 7wk US  -NIPT neg, pt having gender reveal ~20wk  -flu shot done  -AFP ordered    1) Hx preeclampsia with severe features in prior pregnancy  -induced at 35wk.  Had elevated LFTs per outside records so concern for possible HELLP syndrome  -on ASA  -baseline preE labs not in outside records - ordered    2) N/V  -zofran not helping much, rxed reglan    3) Celiac disease  -controlled with diet, probiotic

## 2022-11-23 ENCOUNTER — ROUTINE PRENATAL (OUTPATIENT)
Facility: CLINIC | Age: 32
End: 2022-11-23
Payer: COMMERCIAL

## 2022-11-23 VITALS
BODY MASS INDEX: 29.64 KG/M2 | DIASTOLIC BLOOD PRESSURE: 62 MMHG | HEART RATE: 92 BPM | HEIGHT: 59 IN | WEIGHT: 147 LBS | SYSTOLIC BLOOD PRESSURE: 112 MMHG

## 2022-11-23 DIAGNOSIS — R39.9 URINARY SYMPTOM OR SIGN: Primary | ICD-10-CM

## 2022-11-23 DIAGNOSIS — Z3A.17 17 WEEKS GESTATION OF PREGNANCY: ICD-10-CM

## 2022-11-23 LAB
APPEARANCE: CLEAR
BILIRUBIN: NEGATIVE
GLUCOSE (URINE DIPSTICK): NEGATIVE MG/DL
MULTISTIX LOT#: ABNORMAL NUMERIC
NITRITE, URINE: NEGATIVE
OCCULT BLOOD: NEGATIVE
PH, URINE: 6 (ref 4.5–8)
SPECIFIC GRAVITY: 1.03 (ref 1–1.03)
URINE-COLOR: YELLOW
UROBILINOGEN,SEMI-QN: 0.2 MG/DL (ref 0–1.9)

## 2022-11-23 PROCEDURE — 87186 SC STD MICRODIL/AGAR DIL: CPT | Performed by: OBSTETRICS & GYNECOLOGY

## 2022-11-23 PROCEDURE — 3078F DIAST BP <80 MM HG: CPT | Performed by: OBSTETRICS & GYNECOLOGY

## 2022-11-23 PROCEDURE — 87077 CULTURE AEROBIC IDENTIFY: CPT | Performed by: OBSTETRICS & GYNECOLOGY

## 2022-11-23 PROCEDURE — 3074F SYST BP LT 130 MM HG: CPT | Performed by: OBSTETRICS & GYNECOLOGY

## 2022-11-23 PROCEDURE — 87086 URINE CULTURE/COLONY COUNT: CPT | Performed by: OBSTETRICS & GYNECOLOGY

## 2022-11-23 PROCEDURE — 99213 OFFICE O/P EST LOW 20 MIN: CPT | Performed by: OBSTETRICS & GYNECOLOGY

## 2022-11-23 PROCEDURE — 81002 URINALYSIS NONAUTO W/O SCOPE: CPT | Performed by: OBSTETRICS & GYNECOLOGY

## 2022-11-23 PROCEDURE — 3008F BODY MASS INDEX DOCD: CPT | Performed by: OBSTETRICS & GYNECOLOGY

## 2022-11-23 RX ORDER — NITROFURANTOIN 25; 75 MG/1; MG/1
100 CAPSULE ORAL 2 TIMES DAILY
Qty: 14 CAPSULE | Refills: 0 | Status: SHIPPED | OUTPATIENT
Start: 2022-11-23 | End: 2022-11-30

## 2022-11-23 NOTE — PROGRESS NOTES
Patient noticed urgency and frequency yesterday with some burning on urination  - battling with N/V, started back on Zofran 2 days - somewhat better, trying to increase hydration    - UA reviewed, cultures sent  - rx Macrobid 100 BID

## 2022-11-28 ENCOUNTER — TELEPHONE (OUTPATIENT)
Facility: CLINIC | Age: 32
End: 2022-11-28

## 2022-11-28 NOTE — TELEPHONE ENCOUNTER
Pt is asking if it is safe to take plutonics (meds for acid reflex type of thing) during her pregnancy

## 2022-11-28 NOTE — TELEPHONE ENCOUNTER
Pt takes protonix for CP with GERD/ Celiac diet. She has not taken this, her reflux has gotten better, now starting to have symptoms    Pt can try medications below:     Heartburn: Tums, Mylanta, Pepcid AC or Maalox    If s/s persist to call office. Pt verb understanding.

## 2022-11-28 NOTE — PROGRESS NOTES
Discussed culture result citrobacter koseri and recommendation continue Macrobid, if s/s persist post treatment call office. patient verbalized understanding.

## 2022-12-15 ENCOUNTER — LAB ENCOUNTER (OUTPATIENT)
Dept: LAB | Age: 32
End: 2022-12-15
Attending: STUDENT IN AN ORGANIZED HEALTH CARE EDUCATION/TRAINING PROGRAM
Payer: COMMERCIAL

## 2022-12-15 DIAGNOSIS — O09.299 HX OF PREECLAMPSIA, PRIOR PREGNANCY, CURRENTLY PREGNANT, UNSPECIFIED TRIMESTER: ICD-10-CM

## 2022-12-15 DIAGNOSIS — Z34.82 PRENATAL CARE, SUBSEQUENT PREGNANCY, SECOND TRIMESTER: ICD-10-CM

## 2022-12-15 LAB
ALBUMIN SERPL-MCNC: 2.8 G/DL (ref 3.4–5)
ALBUMIN/GLOB SERPL: 0.7 {RATIO} (ref 1–2)
ALP LIVER SERPL-CCNC: 62 U/L
ALT SERPL-CCNC: 11 U/L
ANION GAP SERPL CALC-SCNC: 5 MMOL/L (ref 0–18)
AST SERPL-CCNC: 10 U/L (ref 15–37)
BASOPHILS # BLD AUTO: 0.07 X10(3) UL (ref 0–0.2)
BASOPHILS NFR BLD AUTO: 0.6 %
BILIRUB SERPL-MCNC: 0.3 MG/DL (ref 0.1–2)
BUN BLD-MCNC: 7 MG/DL (ref 7–18)
CALCIUM BLD-MCNC: 9.7 MG/DL (ref 8.5–10.1)
CHLORIDE SERPL-SCNC: 107 MMOL/L (ref 98–112)
CO2 SERPL-SCNC: 24 MMOL/L (ref 21–32)
CREAT BLD-MCNC: 0.53 MG/DL
CREAT UR-SCNC: 184 MG/DL
EOSINOPHIL # BLD AUTO: 0.24 X10(3) UL (ref 0–0.7)
EOSINOPHIL NFR BLD AUTO: 2.1 %
ERYTHROCYTE [DISTWIDTH] IN BLOOD BY AUTOMATED COUNT: 13.2 %
FASTING STATUS PATIENT QL REPORTED: NO
GFR SERPLBLD BASED ON 1.73 SQ M-ARVRAT: 125 ML/MIN/1.73M2 (ref 60–?)
GLOBULIN PLAS-MCNC: 4 G/DL (ref 2.8–4.4)
GLUCOSE BLD-MCNC: 84 MG/DL (ref 70–99)
HCT VFR BLD AUTO: 36.1 %
HGB BLD-MCNC: 11.6 G/DL
IMM GRANULOCYTES # BLD AUTO: 0.04 X10(3) UL (ref 0–1)
IMM GRANULOCYTES NFR BLD: 0.3 %
LYMPHOCYTES # BLD AUTO: 3.19 X10(3) UL (ref 1–4)
LYMPHOCYTES NFR BLD AUTO: 27.6 %
MCH RBC QN AUTO: 27 PG (ref 26–34)
MCHC RBC AUTO-ENTMCNC: 32.1 G/DL (ref 31–37)
MCV RBC AUTO: 84 FL
MONOCYTES # BLD AUTO: 0.63 X10(3) UL (ref 0.1–1)
MONOCYTES NFR BLD AUTO: 5.5 %
NEUTROPHILS # BLD AUTO: 7.37 X10 (3) UL (ref 1.5–7.7)
NEUTROPHILS # BLD AUTO: 7.37 X10(3) UL (ref 1.5–7.7)
NEUTROPHILS NFR BLD AUTO: 63.9 %
OSMOLALITY SERPL CALC.SUM OF ELEC: 279 MOSM/KG (ref 275–295)
PLATELET # BLD AUTO: 440 10(3)UL (ref 150–450)
POTASSIUM SERPL-SCNC: 4.3 MMOL/L (ref 3.5–5.1)
PROT SERPL-MCNC: 6.8 G/DL (ref 6.4–8.2)
PROT UR-MCNC: 13.2 MG/DL
RBC # BLD AUTO: 4.3 X10(6)UL
SODIUM SERPL-SCNC: 136 MMOL/L (ref 136–145)
WBC # BLD AUTO: 11.5 X10(3) UL (ref 4–11)

## 2022-12-15 PROCEDURE — 80053 COMPREHEN METABOLIC PANEL: CPT

## 2022-12-15 PROCEDURE — 84156 ASSAY OF PROTEIN URINE: CPT

## 2022-12-15 PROCEDURE — 36415 COLL VENOUS BLD VENIPUNCTURE: CPT

## 2022-12-15 PROCEDURE — 82105 ALPHA-FETOPROTEIN SERUM: CPT

## 2022-12-15 PROCEDURE — 82570 ASSAY OF URINE CREATININE: CPT

## 2022-12-15 PROCEDURE — 85025 COMPLETE CBC W/AUTO DIFF WBC: CPT

## 2022-12-16 ENCOUNTER — ROUTINE PRENATAL (OUTPATIENT)
Facility: CLINIC | Age: 32
End: 2022-12-16
Payer: COMMERCIAL

## 2022-12-16 ENCOUNTER — ULTRASOUND ENCOUNTER (OUTPATIENT)
Facility: CLINIC | Age: 32
End: 2022-12-16
Payer: COMMERCIAL

## 2022-12-16 VITALS
DIASTOLIC BLOOD PRESSURE: 56 MMHG | SYSTOLIC BLOOD PRESSURE: 109 MMHG | HEIGHT: 59 IN | HEART RATE: 85 BPM | BODY MASS INDEX: 30.24 KG/M2 | WEIGHT: 150 LBS

## 2022-12-16 DIAGNOSIS — Z34.82 PRENATAL CARE, SUBSEQUENT PREGNANCY, SECOND TRIMESTER: Primary | ICD-10-CM

## 2022-12-16 DIAGNOSIS — Z36.89 ENCOUNTER FOR FETAL ANATOMIC SURVEY: Primary | ICD-10-CM

## 2022-12-16 DIAGNOSIS — O28.3 ABNORMAL FETAL ULTRASOUND: ICD-10-CM

## 2022-12-16 DIAGNOSIS — Z36.89 ENCOUNTER FOR FETAL ANATOMIC SURVEY: ICD-10-CM

## 2022-12-16 PROCEDURE — 3078F DIAST BP <80 MM HG: CPT | Performed by: STUDENT IN AN ORGANIZED HEALTH CARE EDUCATION/TRAINING PROGRAM

## 2022-12-16 PROCEDURE — 3074F SYST BP LT 130 MM HG: CPT | Performed by: STUDENT IN AN ORGANIZED HEALTH CARE EDUCATION/TRAINING PROGRAM

## 2022-12-16 PROCEDURE — 81002 URINALYSIS NONAUTO W/O SCOPE: CPT | Performed by: STUDENT IN AN ORGANIZED HEALTH CARE EDUCATION/TRAINING PROGRAM

## 2022-12-16 PROCEDURE — 3008F BODY MASS INDEX DOCD: CPT | Performed by: STUDENT IN AN ORGANIZED HEALTH CARE EDUCATION/TRAINING PROGRAM

## 2022-12-16 PROCEDURE — 76805 OB US >/= 14 WKS SNGL FETUS: CPT | Performed by: STUDENT IN AN ORGANIZED HEALTH CARE EDUCATION/TRAINING PROGRAM

## 2022-12-16 RX ORDER — PANTOPRAZOLE SODIUM 40 MG/1
40 TABLET, DELAYED RELEASE ORAL DAILY
Qty: 30 TABLET | Refills: 11 | Status: SHIPPED | OUTPATIENT
Start: 2022-12-16

## 2022-12-18 LAB
AFP SMOKING: NO
FAMILY HX NEURAL TUBE DEFECT: NO
INSULIN REQ MATERNAL DIABETES: NO
MATERNAL AGE OF DELIVERY: 33.6 YR
MOM FOR AFP: 1.34
PATIENT'S AFP: 90 NG/ML

## 2023-01-07 ENCOUNTER — TELEPHONE (OUTPATIENT)
Facility: CLINIC | Age: 33
End: 2023-01-07

## 2023-01-07 DIAGNOSIS — O99.519 MATERNAL ASTHMA: ICD-10-CM

## 2023-01-07 DIAGNOSIS — J45.909 MATERNAL ASTHMA: ICD-10-CM

## 2023-01-07 DIAGNOSIS — J45.909 UNCOMPLICATED ASTHMA, UNSPECIFIED ASTHMA SEVERITY, UNSPECIFIED WHETHER PERSISTENT: Primary | ICD-10-CM

## 2023-01-07 NOTE — TELEPHONE ENCOUNTER
On call physician - OB     2023     34year old  at 23w6d requesting Rx nebulizer    Thinks she has a cold  Known asthma managed by PCP Ginger Mckeon MD on Singulair, Fluticasone inh, albuterol inhal    Has an albuterol inhaler at home but says she was hoping to get a nebulizer    Patient sounds fine on the phone. No wheezing, no dyspnea. She claims she contacted her PCP but hasn't heard back yet     Told her she should be tested for COVID-19  Rx nebulizer sent. Told her in future that since she already had known asthma predating pregnancy & treated by a PCP, she should be going through that PCP for medication management. Diagnoses and all orders for this visit:    Uncomplicated asthma, unspecified asthma severity, unspecified whether persistent  -     albuterol (5 MG/ML) 0.5% Inhalation Nebu Soln; Take 0.5 mL (2.5 mg total) by nebulization every 6 (six) hours as needed for Wheezing.     Maternal asthma      Paige Nguyen MD

## 2023-01-12 ENCOUNTER — ROUTINE PRENATAL (OUTPATIENT)
Facility: CLINIC | Age: 33
End: 2023-01-12
Payer: COMMERCIAL

## 2023-01-12 VITALS
SYSTOLIC BLOOD PRESSURE: 108 MMHG | WEIGHT: 149.63 LBS | DIASTOLIC BLOOD PRESSURE: 64 MMHG | HEART RATE: 103 BPM | BODY MASS INDEX: 30.16 KG/M2 | HEIGHT: 59 IN

## 2023-01-12 DIAGNOSIS — Z34.82 PRENATAL CARE, SUBSEQUENT PREGNANCY, SECOND TRIMESTER: Primary | ICD-10-CM

## 2023-01-12 DIAGNOSIS — O23.41 UTI (URINARY TRACT INFECTION) DURING PREGNANCY, FIRST TRIMESTER: ICD-10-CM

## 2023-01-12 LAB
GLUCOSE (URINE DIPSTICK): NEGATIVE MG/DL
MULTISTIX LOT#: NORMAL NUMERIC
PROTEIN (URINE DIPSTICK): NEGATIVE MG/DL

## 2023-01-12 PROCEDURE — 81002 URINALYSIS NONAUTO W/O SCOPE: CPT

## 2023-01-12 PROCEDURE — 3008F BODY MASS INDEX DOCD: CPT

## 2023-01-12 PROCEDURE — 87086 URINE CULTURE/COLONY COUNT: CPT

## 2023-01-12 PROCEDURE — 3078F DIAST BP <80 MM HG: CPT

## 2023-01-12 PROCEDURE — 3074F SYST BP LT 130 MM HG: CPT

## 2023-01-19 ENCOUNTER — TELEPHONE (OUTPATIENT)
Facility: CLINIC | Age: 33
End: 2023-01-19

## 2023-01-19 ENCOUNTER — ULTRASOUND ENCOUNTER (OUTPATIENT)
Dept: PERINATAL CARE | Facility: HOSPITAL | Age: 33
End: 2023-01-19
Attending: OBSTETRICS & GYNECOLOGY
Payer: COMMERCIAL

## 2023-01-19 ENCOUNTER — OFFICE VISIT (OUTPATIENT)
Dept: PERINATAL CARE | Facility: HOSPITAL | Age: 33
End: 2023-01-19
Attending: STUDENT IN AN ORGANIZED HEALTH CARE EDUCATION/TRAINING PROGRAM
Payer: COMMERCIAL

## 2023-01-19 VITALS
WEIGHT: 150 LBS | SYSTOLIC BLOOD PRESSURE: 121 MMHG | DIASTOLIC BLOOD PRESSURE: 80 MMHG | HEART RATE: 102 BPM | BODY MASS INDEX: 30.24 KG/M2 | HEIGHT: 59 IN

## 2023-01-19 DIAGNOSIS — O28.3 ABNORMAL PREGNANCY US: ICD-10-CM

## 2023-01-19 DIAGNOSIS — Z36.89 ENCOUNTER FOR FETAL ANATOMIC SURVEY: ICD-10-CM

## 2023-01-19 DIAGNOSIS — Z34.82 PRENATAL CARE, SUBSEQUENT PREGNANCY, SECOND TRIMESTER: Primary | ICD-10-CM

## 2023-01-19 DIAGNOSIS — O28.3 ABNORMAL PREGNANCY US: Primary | ICD-10-CM

## 2023-01-19 DIAGNOSIS — O09.299 HX OF PREECLAMPSIA, PRIOR PREGNANCY, CURRENTLY PREGNANT, UNSPECIFIED TRIMESTER: ICD-10-CM

## 2023-01-19 PROCEDURE — 76811 OB US DETAILED SNGL FETUS: CPT | Performed by: OBSTETRICS & GYNECOLOGY

## 2023-01-19 NOTE — TELEPHONE ENCOUNTER
Pt called central scheduling to schedule glucose test; there is no order; pls call pt when available to schedule

## 2023-01-31 ENCOUNTER — TELEPHONE (OUTPATIENT)
Facility: CLINIC | Age: 33
End: 2023-01-31

## 2023-01-31 NOTE — TELEPHONE ENCOUNTER
PT has cold s/s sore throat, running nose, congestion for 3d.     27 2/7    Was dx with URI per PCP no meds given    She took Robitussin, theraflu    List of meds pt can take sent to her [employer] [facility]:  Nasal spray, humidifier, fluids, Tylenol, Benadyl. If s/s does not get better f/u with PCP    Patient verbalized understanding, agreed to and intend to comply with plan of care.

## 2023-02-06 ENCOUNTER — LAB ENCOUNTER (OUTPATIENT)
Dept: LAB | Age: 33
End: 2023-02-06
Payer: COMMERCIAL

## 2023-02-07 ENCOUNTER — TELEPHONE (OUTPATIENT)
Facility: CLINIC | Age: 33
End: 2023-02-07

## 2023-02-07 DIAGNOSIS — O99.810 ABNORMAL MATERNAL GLUCOSE TOLERANCE, ANTEPARTUM: Primary | ICD-10-CM

## 2023-02-07 DIAGNOSIS — Z11.4 ENCOUNTER FOR SCREENING FOR HIV: ICD-10-CM

## 2023-02-07 DIAGNOSIS — Z13.0 SCREENING FOR DEFICIENCY ANEMIA: Primary | ICD-10-CM

## 2023-02-07 NOTE — PROGRESS NOTES
Nausea. Headaches with the infusion pump. Constipation.
SHRUTI  Pt is still having severe morning sickness. She has been working with [facility] to manage this sickness. Pt was unable to tolerate the lowest dose of IV pump Zofran d/t constipation and headaches.  [facility] RNs are unable to increase her zofran due to
Statement Selected

## 2023-02-07 NOTE — TELEPHONE ENCOUNTER
Spoke with pt. I let her know Bon Mcclain would highly recommend she to do the 3 hour GTT. We need to determine if she has gestational diabetes because it can affect her baby negatively. If still does not want to do it, then we will proclaim her as a GDM and she will see the diabetic educator and monitor her blood sugars throughout the pregnancy. Pt would prefer to see diabetic education and bypass the 3 hour glucose. I let her know we will call her once the order has been placed with diabetic educations phone number. Order pended. Verbalized understanding.

## 2023-02-07 NOTE — TELEPHONE ENCOUNTER
Spoke with pt. She is currently taking Amoxicillin for a respiratory infection prescribed by her PCP. In addition to the Amoxicillin she has been trying home remedies such as hot tea with honey & lemon with cane sugar. Has been drinking it frequently. She thinks that is why her glucose was elevated. She does not want to do the 3 hour glucose and would prefer to monitor blood sugars. I let her know I would get a message to Bon Mcclain to see if it might be possible to repeat the 1 hour glucose once she is feeling better and call with recommendations. Medications safe to take in pregnancy sent to pt's [facility]. Advised to rest & stay hydrated. Verbalized understanding.

## 2023-02-07 NOTE — TELEPHONE ENCOUNTER
Patient is 28 weeks preg and is suppose to do the 3 hour glucose test, she doesn't want to.    She wants to just monitor her levels

## 2023-02-07 NOTE — TELEPHONE ENCOUNTER
Spoke with pt. Aware diabetic education order has been placed. Phone number given to call and schedule. Verbalized understanding.

## 2023-02-10 ENCOUNTER — ROUTINE PRENATAL (OUTPATIENT)
Facility: CLINIC | Age: 33
End: 2023-02-10
Payer: COMMERCIAL

## 2023-02-10 VITALS
DIASTOLIC BLOOD PRESSURE: 66 MMHG | HEIGHT: 59 IN | HEART RATE: 100 BPM | BODY MASS INDEX: 30.64 KG/M2 | SYSTOLIC BLOOD PRESSURE: 126 MMHG | WEIGHT: 152 LBS

## 2023-02-10 DIAGNOSIS — Z34.83 PRENATAL CARE, SUBSEQUENT PREGNANCY, THIRD TRIMESTER: Primary | ICD-10-CM

## 2023-02-10 DIAGNOSIS — Z3A.28 28 WEEKS GESTATION OF PREGNANCY: ICD-10-CM

## 2023-02-10 PROCEDURE — 3074F SYST BP LT 130 MM HG: CPT | Performed by: OBSTETRICS & GYNECOLOGY

## 2023-02-10 PROCEDURE — 3008F BODY MASS INDEX DOCD: CPT | Performed by: OBSTETRICS & GYNECOLOGY

## 2023-02-10 PROCEDURE — 81002 URINALYSIS NONAUTO W/O SCOPE: CPT | Performed by: OBSTETRICS & GYNECOLOGY

## 2023-02-10 PROCEDURE — 3078F DIAST BP <80 MM HG: CPT | Performed by: OBSTETRICS & GYNECOLOGY

## 2023-02-10 RX ORDER — AMOXICILLIN AND CLAVULANATE POTASSIUM 875; 125 MG/1; MG/1
1 TABLET, FILM COATED ORAL EVERY 12 HOURS
COMMUNITY
Start: 2023-02-03

## 2023-02-15 ENCOUNTER — HOSPITAL ENCOUNTER (OUTPATIENT)
Facility: HOSPITAL | Age: 33
Discharge: HOME | End: 2023-02-15
Attending: OBSTETRICS & GYNECOLOGY | Admitting: OBSTETRICS & GYNECOLOGY
Payer: COMMERCIAL

## 2023-02-15 ENCOUNTER — TELEPHONE (OUTPATIENT)
Facility: CLINIC | Age: 33
End: 2023-02-15

## 2023-02-15 VITALS
SYSTOLIC BLOOD PRESSURE: 120 MMHG | BODY MASS INDEX: 30.64 KG/M2 | HEIGHT: 59 IN | HEART RATE: 113 BPM | DIASTOLIC BLOOD PRESSURE: 68 MMHG | TEMPERATURE: 98 F | RESPIRATION RATE: 20 BRPM | WEIGHT: 152 LBS

## 2023-02-15 PROCEDURE — 99213 OFFICE O/P EST LOW 20 MIN: CPT

## 2023-02-15 PROCEDURE — 59025 FETAL NON-STRESS TEST: CPT

## 2023-02-15 NOTE — TELEPHONE ENCOUNTER
29 3/7 weeks preg, she stated that she slipped and fell trying to get groceries out of the car, rt side of her stomach landed on the car 3 days ago, Monday night. Last seen 2/10/23  Hx preeclampsia   Celiac disease  Asthma    Last US 1/19/23  Ultrasound Findings:  Single IUP in cephalic presentation  Placenta is anterior. A 3 vessel cord is noted. Cardiac ac??vity is present at 155 bpm   g ( 2 lb 0 oz);  MVP is 4.8 cm . B+    FM+    Denies uc,  bleeding, leaking, pain or soreness. Per EP if FM+ pt does not need to go to LD for monitoring relayed to pt. Per pt she actually fell last night. Advised to go to LD. LD notified.

## 2023-02-15 NOTE — PROGRESS NOTES
EFMs applied, FHTs 155. Pt states she hit the R side of her abdomen on the taillight of her car last night. Pt states she had \" a little cramping\" last night, but cramping is more consistent today. Pt denies LOF and VB. Pt reports +FM. Pt states \"it feels like muscle pain\" and that \"it was hard to sleep last night\". Pt denies any abdominal tenderness with palpation. No visible bruising noted. Pt denies any problems with pregnancy. Pt had pre-eclampsia and a PPH with her last pregnancy and needed a blood transfusion after delivery. Pt has anemia and asthma. Pt denies any other medical problems. Admission assessment initiated at this time.

## 2023-02-15 NOTE — TELEPHONE ENCOUNTER
Patient is 30 weeks preg, she stated that she slipped and fell into the car 3 days ago. Baby is moving no spotting.  She states she's a little sore but that's all

## 2023-02-15 NOTE — PROGRESS NOTES
Dr Dinorah Moser called per this RN. This RN informs her pt , 29+3. Pt fell against her car last night and hit the R side of her abdomen. Pt having some cramping last night, more consistent cramping today. No ctxs noted per toco at this time. FHTs reassuring. Pt denies LOF and VB. +FM. Pt is + blood type. Orders received at this time.

## 2023-02-15 NOTE — NST
Nonstress Test   Patient: Shelby Mar    Gestation: 29w3d    NST:       Variability: Moderate           Accelerations: Yes           Decelerations: None            Baseline: 135 BPM           Uterine Irritability: No           Contractions: Irregular                    Contraction Frequency: 5-12                   Acoustic Stimulator: No           Nonstress Test Interpretation: Appropriate for gestational age           Nonstress Test Second Interpretation: Appropriate for gestational age                     Additional Comments

## 2023-02-15 NOTE — DISCHARGE INSTRUCTIONS
Discharge Instructions    Diet: Diet as tolerated. Drink 8-10 glasses of water daily  Activity: Normal activity         General Instructions    Call your OB doctor if: Fluid leaking from your vagina;Uterine contractions 10 minutes or closer for 1 to 2 hours;Uterine contractions increasing in intensity and frequency; Decrease in fetal movement;Vaginal bleeding;Temperature greater than 100F;Vaginal or rectal pressure

## 2023-02-15 NOTE — PROGRESS NOTES
, 29+3 arrives per ambulation. Pt c/o cramping after hitting her abdomen last night. Pt taken to Triage 2 and changing at this time.

## 2023-02-17 NOTE — TELEPHONE ENCOUNTER
Spoke with pt. Nausea has been bad the last couple days. Took her last Zofran yesterday. She was hoping to get a refill for 30 tablets. I let her know I would get a message to the provider and call if unable to refill. Order pended. To call if no improvement. Verbalized understanding.

## 2023-02-17 NOTE — TELEPHONE ENCOUNTER
Pt calling to get a refill on anti-nausea medication. Pt states she called the pharmacy already. Please advise.

## 2023-02-18 RX ORDER — ONDANSETRON 4 MG/1
4 TABLET, FILM COATED ORAL EVERY 8 HOURS PRN
Qty: 30 TABLET | Refills: 0 | Status: SHIPPED | OUTPATIENT
Start: 2023-02-18

## 2023-02-22 ENCOUNTER — LAB ENCOUNTER (OUTPATIENT)
Dept: LAB | Age: 33
End: 2023-02-22
Attending: OBSTETRICS & GYNECOLOGY
Payer: COMMERCIAL

## 2023-02-22 DIAGNOSIS — Z13.0 SCREENING FOR DEFICIENCY ANEMIA: ICD-10-CM

## 2023-02-22 DIAGNOSIS — Z11.4 ENCOUNTER FOR SCREENING FOR HIV: ICD-10-CM

## 2023-02-22 DIAGNOSIS — Z34.83 PRENATAL CARE, SUBSEQUENT PREGNANCY, THIRD TRIMESTER: ICD-10-CM

## 2023-02-22 LAB
ALBUMIN SERPL-MCNC: 2.6 G/DL (ref 3.4–5)
ALBUMIN/GLOB SERPL: 0.6 {RATIO} (ref 1–2)
ALP LIVER SERPL-CCNC: 136 U/L
ALT SERPL-CCNC: 13 U/L
ANION GAP SERPL CALC-SCNC: 6 MMOL/L (ref 0–18)
AST SERPL-CCNC: 15 U/L (ref 15–37)
BASOPHILS # BLD AUTO: 0.05 X10(3) UL (ref 0–0.2)
BASOPHILS NFR BLD AUTO: 0.5 %
BILIRUB SERPL-MCNC: 0.4 MG/DL (ref 0.1–2)
BUN BLD-MCNC: 6 MG/DL (ref 7–18)
CALCIUM BLD-MCNC: 9.2 MG/DL (ref 8.5–10.1)
CHLORIDE SERPL-SCNC: 108 MMOL/L (ref 98–112)
CO2 SERPL-SCNC: 24 MMOL/L (ref 21–32)
CREAT BLD-MCNC: 0.56 MG/DL
CREAT UR-SCNC: 215 MG/DL
EOSINOPHIL # BLD AUTO: 0.17 X10(3) UL (ref 0–0.7)
EOSINOPHIL NFR BLD AUTO: 1.5 %
ERYTHROCYTE [DISTWIDTH] IN BLOOD BY AUTOMATED COUNT: 14.1 %
FASTING STATUS PATIENT QL REPORTED: YES
GFR SERPLBLD BASED ON 1.73 SQ M-ARVRAT: 124 ML/MIN/1.73M2 (ref 60–?)
GLOBULIN PLAS-MCNC: 4.3 G/DL (ref 2.8–4.4)
GLUCOSE 1H P GLC SERPL-MCNC: 99 MG/DL
GLUCOSE BLD-MCNC: 83 MG/DL (ref 70–99)
HCT VFR BLD AUTO: 34.1 %
HGB BLD-MCNC: 10.5 G/DL
IMM GRANULOCYTES # BLD AUTO: 0.06 X10(3) UL (ref 0–1)
IMM GRANULOCYTES NFR BLD: 0.5 %
LYMPHOCYTES # BLD AUTO: 2.94 X10(3) UL (ref 1–4)
LYMPHOCYTES NFR BLD AUTO: 26.7 %
MCH RBC QN AUTO: 24.3 PG (ref 26–34)
MCHC RBC AUTO-ENTMCNC: 30.8 G/DL (ref 31–37)
MCV RBC AUTO: 78.9 FL
MONOCYTES # BLD AUTO: 0.87 X10(3) UL (ref 0.1–1)
MONOCYTES NFR BLD AUTO: 7.9 %
NEUTROPHILS # BLD AUTO: 6.92 X10 (3) UL (ref 1.5–7.7)
NEUTROPHILS # BLD AUTO: 6.92 X10(3) UL (ref 1.5–7.7)
NEUTROPHILS NFR BLD AUTO: 62.9 %
OSMOLALITY SERPL CALC.SUM OF ELEC: 283 MOSM/KG (ref 275–295)
PLATELET # BLD AUTO: 509 10(3)UL (ref 150–450)
POTASSIUM SERPL-SCNC: 3.9 MMOL/L (ref 3.5–5.1)
PROT SERPL-MCNC: 6.9 G/DL (ref 6.4–8.2)
PROT UR-MCNC: 40.4 MG/DL
RBC # BLD AUTO: 4.32 X10(6)UL
SODIUM SERPL-SCNC: 138 MMOL/L (ref 136–145)
WBC # BLD AUTO: 11 X10(3) UL (ref 4–11)

## 2023-02-22 PROCEDURE — 82950 GLUCOSE TEST: CPT

## 2023-02-22 PROCEDURE — 87389 HIV-1 AG W/HIV-1&-2 AB AG IA: CPT

## 2023-02-22 PROCEDURE — 85025 COMPLETE CBC W/AUTO DIFF WBC: CPT

## 2023-02-22 PROCEDURE — 80053 COMPREHEN METABOLIC PANEL: CPT

## 2023-02-22 PROCEDURE — 82570 ASSAY OF URINE CREATININE: CPT

## 2023-02-22 PROCEDURE — 84156 ASSAY OF PROTEIN URINE: CPT

## 2023-02-22 PROCEDURE — 36415 COLL VENOUS BLD VENIPUNCTURE: CPT

## 2023-02-24 ENCOUNTER — ROUTINE PRENATAL (OUTPATIENT)
Facility: CLINIC | Age: 33
End: 2023-02-24
Payer: COMMERCIAL

## 2023-02-24 VITALS
HEART RATE: 95 BPM | BODY MASS INDEX: 30.04 KG/M2 | HEIGHT: 59 IN | DIASTOLIC BLOOD PRESSURE: 64 MMHG | SYSTOLIC BLOOD PRESSURE: 128 MMHG | WEIGHT: 149 LBS

## 2023-02-24 DIAGNOSIS — Z34.83 PRENATAL CARE, SUBSEQUENT PREGNANCY, THIRD TRIMESTER: Primary | ICD-10-CM

## 2023-02-24 LAB
GLUCOSE (URINE DIPSTICK): NEGATIVE MG/DL
MULTISTIX LOT#: NORMAL NUMERIC

## 2023-02-24 PROCEDURE — 81002 URINALYSIS NONAUTO W/O SCOPE: CPT | Performed by: STUDENT IN AN ORGANIZED HEALTH CARE EDUCATION/TRAINING PROGRAM

## 2023-02-24 PROCEDURE — 3078F DIAST BP <80 MM HG: CPT | Performed by: STUDENT IN AN ORGANIZED HEALTH CARE EDUCATION/TRAINING PROGRAM

## 2023-02-24 PROCEDURE — 3074F SYST BP LT 130 MM HG: CPT | Performed by: STUDENT IN AN ORGANIZED HEALTH CARE EDUCATION/TRAINING PROGRAM

## 2023-02-24 PROCEDURE — 3008F BODY MASS INDEX DOCD: CPT | Performed by: STUDENT IN AN ORGANIZED HEALTH CARE EDUCATION/TRAINING PROGRAM

## 2023-02-24 RX ORDER — PROCHLORPERAZINE MALEATE 5 MG/1
5 TABLET ORAL EVERY 6 HOURS PRN
Qty: 45 TABLET | Refills: 1 | Status: SHIPPED | OUTPATIENT
Start: 2023-02-24

## 2023-03-14 ENCOUNTER — ROUTINE PRENATAL (OUTPATIENT)
Facility: CLINIC | Age: 33
End: 2023-03-14
Payer: COMMERCIAL

## 2023-03-14 VITALS
BODY MASS INDEX: 30.68 KG/M2 | WEIGHT: 152.19 LBS | HEART RATE: 106 BPM | DIASTOLIC BLOOD PRESSURE: 76 MMHG | HEIGHT: 59 IN | SYSTOLIC BLOOD PRESSURE: 122 MMHG

## 2023-03-14 DIAGNOSIS — O99.013 ANEMIA DURING PREGNANCY IN THIRD TRIMESTER: ICD-10-CM

## 2023-03-14 DIAGNOSIS — Z23 IMMUNIZATION DUE: ICD-10-CM

## 2023-03-14 DIAGNOSIS — Z34.93 ENCOUNTER FOR SUPERVISION OF NORMAL PREGNANCY IN THIRD TRIMESTER, UNSPECIFIED GRAVIDITY: Primary | ICD-10-CM

## 2023-03-14 LAB
GLUCOSE (URINE DIPSTICK): NEGATIVE MG/DL
MULTISTIX LOT#: NORMAL NUMERIC

## 2023-03-14 PROCEDURE — 3074F SYST BP LT 130 MM HG: CPT

## 2023-03-14 PROCEDURE — 3008F BODY MASS INDEX DOCD: CPT

## 2023-03-14 PROCEDURE — 90715 TDAP VACCINE 7 YRS/> IM: CPT

## 2023-03-14 PROCEDURE — 81002 URINALYSIS NONAUTO W/O SCOPE: CPT

## 2023-03-14 PROCEDURE — 90471 IMMUNIZATION ADMIN: CPT

## 2023-03-14 PROCEDURE — 3078F DIAST BP <80 MM HG: CPT

## 2023-03-29 ENCOUNTER — TELEPHONE (OUTPATIENT)
Facility: CLINIC | Age: 33
End: 2023-03-29

## 2023-03-29 NOTE — TELEPHONE ENCOUNTER
Spoke with patient. She is aware of Dr Meri Arriaza recommendations and will not take the prednisone. She will discuss further at her appointment tomorrow. Understanding verbalized.

## 2023-03-29 NOTE — TELEPHONE ENCOUNTER
Spoke with patient. She was seen yesterday for cold like symptoms. Tested and everything was negative. She has the list of medications that are safe in pregnancy. Has not tried anything from the list. In the past she has used prednisone for her allergy type symptoms and it has helped. Recommended her to try something from the approved list but she states she has been getting bloody noses. She feels her nose is dry and only when she blows it does she see blood. No active bleeding. States yesterday at the doctors office her BP was WNL but her pulse was high. She has an appointment tomorrow but is wondering if she can take the prednisone today? Will route. Understanding verbalized.

## 2023-03-30 ENCOUNTER — ROUTINE PRENATAL (OUTPATIENT)
Facility: CLINIC | Age: 33
End: 2023-03-30
Payer: COMMERCIAL

## 2023-03-30 VITALS
BODY MASS INDEX: 30.24 KG/M2 | HEIGHT: 59 IN | DIASTOLIC BLOOD PRESSURE: 78 MMHG | WEIGHT: 150 LBS | HEART RATE: 122 BPM | SYSTOLIC BLOOD PRESSURE: 109 MMHG

## 2023-03-30 DIAGNOSIS — Z34.83 PRENATAL CARE, SUBSEQUENT PREGNANCY, THIRD TRIMESTER: Primary | ICD-10-CM

## 2023-03-30 DIAGNOSIS — R10.13 EPIGASTRIC PAIN: ICD-10-CM

## 2023-03-30 LAB
GLUCOSE (URINE DIPSTICK): NEGATIVE MG/DL
MULTISTIX LOT#: NORMAL NUMERIC

## 2023-03-30 PROCEDURE — 81002 URINALYSIS NONAUTO W/O SCOPE: CPT | Performed by: STUDENT IN AN ORGANIZED HEALTH CARE EDUCATION/TRAINING PROGRAM

## 2023-03-30 PROCEDURE — 3008F BODY MASS INDEX DOCD: CPT | Performed by: STUDENT IN AN ORGANIZED HEALTH CARE EDUCATION/TRAINING PROGRAM

## 2023-03-30 PROCEDURE — 3078F DIAST BP <80 MM HG: CPT | Performed by: STUDENT IN AN ORGANIZED HEALTH CARE EDUCATION/TRAINING PROGRAM

## 2023-03-30 PROCEDURE — 3074F SYST BP LT 130 MM HG: CPT | Performed by: STUDENT IN AN ORGANIZED HEALTH CARE EDUCATION/TRAINING PROGRAM

## 2023-04-06 ENCOUNTER — ROUTINE PRENATAL (OUTPATIENT)
Facility: CLINIC | Age: 33
End: 2023-04-06
Payer: COMMERCIAL

## 2023-04-06 VITALS
HEIGHT: 59 IN | WEIGHT: 151 LBS | HEART RATE: 80 BPM | SYSTOLIC BLOOD PRESSURE: 122 MMHG | BODY MASS INDEX: 30.44 KG/M2 | DIASTOLIC BLOOD PRESSURE: 74 MMHG

## 2023-04-06 DIAGNOSIS — O09.299 HX OF PREECLAMPSIA, PRIOR PREGNANCY, CURRENTLY PREGNANT, UNSPECIFIED TRIMESTER: ICD-10-CM

## 2023-04-06 DIAGNOSIS — Z3A.36 36 WEEKS GESTATION OF PREGNANCY: ICD-10-CM

## 2023-04-06 DIAGNOSIS — Z34.83 PRENATAL CARE, SUBSEQUENT PREGNANCY, THIRD TRIMESTER: Primary | ICD-10-CM

## 2023-04-06 LAB
CREAT UR-SCNC: 208 MG/DL
GLUCOSE (URINE DIPSTICK): NEGATIVE MG/DL
MULTISTIX LOT#: ABNORMAL NUMERIC
PROT UR-MCNC: 39 MG/DL
PROT/CREAT UR-RTO: 0.19
PROTEIN (URINE DIPSTICK): 30 MG/DL

## 2023-04-06 PROCEDURE — 87081 CULTURE SCREEN ONLY: CPT

## 2023-04-06 PROCEDURE — 3074F SYST BP LT 130 MM HG: CPT

## 2023-04-06 PROCEDURE — 82570 ASSAY OF URINE CREATININE: CPT

## 2023-04-06 PROCEDURE — 81002 URINALYSIS NONAUTO W/O SCOPE: CPT

## 2023-04-06 PROCEDURE — 87653 STREP B DNA AMP PROBE: CPT

## 2023-04-06 PROCEDURE — 3008F BODY MASS INDEX DOCD: CPT

## 2023-04-06 PROCEDURE — 3078F DIAST BP <80 MM HG: CPT

## 2023-04-06 PROCEDURE — 84156 ASSAY OF PROTEIN URINE: CPT

## 2023-04-08 LAB — GROUP B STREP BY PCR FOR PCR OVT: NEGATIVE

## 2023-04-12 ENCOUNTER — LAB ENCOUNTER (OUTPATIENT)
Dept: LAB | Age: 33
End: 2023-04-12
Attending: STUDENT IN AN ORGANIZED HEALTH CARE EDUCATION/TRAINING PROGRAM
Payer: COMMERCIAL

## 2023-04-12 DIAGNOSIS — R10.13 EPIGASTRIC PAIN: ICD-10-CM

## 2023-04-12 DIAGNOSIS — O99.013 ANEMIA DURING PREGNANCY IN THIRD TRIMESTER: ICD-10-CM

## 2023-04-12 LAB
ALBUMIN SERPL-MCNC: 2.4 G/DL (ref 3.4–5)
ALBUMIN/GLOB SERPL: 0.5 {RATIO} (ref 1–2)
ALP LIVER SERPL-CCNC: 259 U/L
ALT SERPL-CCNC: 15 U/L
ANION GAP SERPL CALC-SCNC: 5 MMOL/L (ref 0–18)
AST SERPL-CCNC: 16 U/L (ref 15–37)
BASOPHILS # BLD AUTO: 0.09 X10(3) UL (ref 0–0.2)
BASOPHILS NFR BLD AUTO: 0.8 %
BILIRUB SERPL-MCNC: 0.6 MG/DL (ref 0.1–2)
BUN BLD-MCNC: 9 MG/DL (ref 7–18)
CALCIUM BLD-MCNC: 9.3 MG/DL (ref 8.5–10.1)
CHLORIDE SERPL-SCNC: 107 MMOL/L (ref 98–112)
CO2 SERPL-SCNC: 21 MMOL/L (ref 21–32)
CREAT BLD-MCNC: 0.66 MG/DL
EOSINOPHIL # BLD AUTO: 0.17 X10(3) UL (ref 0–0.7)
EOSINOPHIL NFR BLD AUTO: 1.5 %
ERYTHROCYTE [DISTWIDTH] IN BLOOD BY AUTOMATED COUNT: 16.6 %
FASTING STATUS PATIENT QL REPORTED: NO
GFR SERPLBLD BASED ON 1.73 SQ M-ARVRAT: 119 ML/MIN/1.73M2 (ref 60–?)
GLOBULIN PLAS-MCNC: 4.6 G/DL (ref 2.8–4.4)
GLUCOSE BLD-MCNC: 83 MG/DL (ref 70–99)
HCT VFR BLD AUTO: 33.5 %
HGB BLD-MCNC: 10 G/DL
IMM GRANULOCYTES # BLD AUTO: 0.05 X10(3) UL (ref 0–1)
IMM GRANULOCYTES NFR BLD: 0.4 %
LYMPHOCYTES # BLD AUTO: 3.95 X10(3) UL (ref 1–4)
LYMPHOCYTES NFR BLD AUTO: 35 %
MCH RBC QN AUTO: 21.6 PG (ref 26–34)
MCHC RBC AUTO-ENTMCNC: 29.9 G/DL (ref 31–37)
MCV RBC AUTO: 72.2 FL
MONOCYTES # BLD AUTO: 0.77 X10(3) UL (ref 0.1–1)
MONOCYTES NFR BLD AUTO: 6.8 %
NEUTROPHILS # BLD AUTO: 6.24 X10 (3) UL (ref 1.5–7.7)
NEUTROPHILS # BLD AUTO: 6.24 X10(3) UL (ref 1.5–7.7)
NEUTROPHILS NFR BLD AUTO: 55.5 %
OSMOLALITY SERPL CALC.SUM OF ELEC: 274 MOSM/KG (ref 275–295)
PLATELET # BLD AUTO: 540 10(3)UL (ref 150–450)
POTASSIUM SERPL-SCNC: 3.9 MMOL/L (ref 3.5–5.1)
PROT SERPL-MCNC: 7 G/DL (ref 6.4–8.2)
RBC # BLD AUTO: 4.64 X10(6)UL
SODIUM SERPL-SCNC: 133 MMOL/L (ref 136–145)
URATE SERPL-MCNC: 5 MG/DL
WBC # BLD AUTO: 11.3 X10(3) UL (ref 4–11)

## 2023-04-12 PROCEDURE — 80053 COMPREHEN METABOLIC PANEL: CPT

## 2023-04-12 PROCEDURE — 85025 COMPLETE CBC W/AUTO DIFF WBC: CPT

## 2023-04-12 PROCEDURE — 84550 ASSAY OF BLOOD/URIC ACID: CPT

## 2023-04-12 PROCEDURE — 36415 COLL VENOUS BLD VENIPUNCTURE: CPT

## 2023-04-13 ENCOUNTER — ROUTINE PRENATAL (OUTPATIENT)
Facility: CLINIC | Age: 33
End: 2023-04-13
Payer: COMMERCIAL

## 2023-04-13 ENCOUNTER — TELEPHONE (OUTPATIENT)
Facility: CLINIC | Age: 33
End: 2023-04-13

## 2023-04-13 VITALS
HEIGHT: 59 IN | SYSTOLIC BLOOD PRESSURE: 122 MMHG | WEIGHT: 152 LBS | DIASTOLIC BLOOD PRESSURE: 72 MMHG | BODY MASS INDEX: 30.64 KG/M2 | HEART RATE: 101 BPM

## 2023-04-13 DIAGNOSIS — Z3A.37 37 WEEKS GESTATION OF PREGNANCY: ICD-10-CM

## 2023-04-13 DIAGNOSIS — Z34.83 PRENATAL CARE, SUBSEQUENT PREGNANCY, THIRD TRIMESTER: Primary | ICD-10-CM

## 2023-04-13 PROCEDURE — 3074F SYST BP LT 130 MM HG: CPT | Performed by: OBSTETRICS & GYNECOLOGY

## 2023-04-13 PROCEDURE — 81002 URINALYSIS NONAUTO W/O SCOPE: CPT | Performed by: OBSTETRICS & GYNECOLOGY

## 2023-04-13 PROCEDURE — 3008F BODY MASS INDEX DOCD: CPT | Performed by: OBSTETRICS & GYNECOLOGY

## 2023-04-13 PROCEDURE — 3078F DIAST BP <80 MM HG: CPT | Performed by: OBSTETRICS & GYNECOLOGY

## 2023-04-14 ENCOUNTER — TELEPHONE (OUTPATIENT)
Facility: CLINIC | Age: 33
End: 2023-04-14

## 2023-04-14 PROBLEM — O99.013 ANEMIA COMPLICATING PREGNANCY IN THIRD TRIMESTER (HCC): Status: ACTIVE | Noted: 2023-04-14

## 2023-04-14 PROBLEM — O99.013 ANEMIA COMPLICATING PREGNANCY IN THIRD TRIMESTER: Status: ACTIVE | Noted: 2023-04-14

## 2023-04-14 RX ORDER — FAMOTIDINE 10 MG/ML
20 INJECTION, SOLUTION INTRAVENOUS ONCE
OUTPATIENT
Start: 2023-04-14

## 2023-04-14 RX ORDER — METHYLPREDNISOLONE SODIUM SUCCINATE 125 MG/2ML
125 INJECTION, POWDER, LYOPHILIZED, FOR SOLUTION INTRAMUSCULAR; INTRAVENOUS ONCE
OUTPATIENT
Start: 2023-04-14

## 2023-04-14 RX ORDER — DIPHENHYDRAMINE HYDROCHLORIDE 50 MG/ML
25 INJECTION INTRAMUSCULAR; INTRAVENOUS ONCE
OUTPATIENT
Start: 2023-04-14

## 2023-04-14 RX ORDER — METHYLPREDNISOLONE SODIUM SUCCINATE 40 MG/ML
40 INJECTION, POWDER, LYOPHILIZED, FOR SOLUTION INTRAMUSCULAR; INTRAVENOUS ONCE
OUTPATIENT
Start: 2023-04-14

## 2023-04-17 ENCOUNTER — HOSPITAL ENCOUNTER (OUTPATIENT)
Facility: HOSPITAL | Age: 33
Discharge: HOME | End: 2023-04-17
Attending: OBSTETRICS & GYNECOLOGY | Admitting: OBSTETRICS & GYNECOLOGY
Payer: COMMERCIAL

## 2023-04-17 ENCOUNTER — TELEPHONE (OUTPATIENT)
Facility: CLINIC | Age: 33
End: 2023-04-17

## 2023-04-17 ENCOUNTER — APPOINTMENT (OUTPATIENT)
Dept: ULTRASOUND IMAGING | Facility: HOSPITAL | Age: 33
End: 2023-04-17
Attending: OBSTETRICS & GYNECOLOGY
Payer: COMMERCIAL

## 2023-04-17 VITALS
DIASTOLIC BLOOD PRESSURE: 82 MMHG | HEART RATE: 93 BPM | RESPIRATION RATE: 16 BRPM | TEMPERATURE: 98 F | SYSTOLIC BLOOD PRESSURE: 132 MMHG

## 2023-04-17 PROCEDURE — 59025 FETAL NON-STRESS TEST: CPT | Performed by: OBSTETRICS & GYNECOLOGY

## 2023-04-17 PROCEDURE — 76815 OB US LIMITED FETUS(S): CPT | Performed by: OBSTETRICS & GYNECOLOGY

## 2023-04-17 RX ORDER — FLUTICASONE FUROATE AND VILANTEROL 200; 25 UG/1; UG/1
1 POWDER RESPIRATORY (INHALATION) DAILY
COMMUNITY

## 2023-04-17 RX ORDER — ALBUTEROL SULFATE 90 UG/1
AEROSOL, METERED RESPIRATORY (INHALATION) EVERY 6 HOURS PRN
COMMUNITY

## 2023-04-17 NOTE — TELEPHONE ENCOUNTER
C/O LOF-clear onset  1600, uc q hourly, decreased FM     38       She's not sure if it is urine or not. Denies vaginal bleeding. Had breakfast- had bread. Advised to eat/drink something sweet. Advised to go to LD for evaluation and f/u with our office. Patient verbalized understanding, agreed to and intend to comply with plan of care. LD notified.

## 2023-04-17 NOTE — NST
Nonstress Test   Patient: Lauren Orr    Gestation: 38w1d    NST:       Variability: Moderate           Accelerations: Yes           Decelerations: None            Baseline: 130 BPM           Uterine Irritability: No           Contractions: Irregular                    Contraction Frequency: 6-10                   Acoustic Stimulator: No           Nonstress Test Interpretation: Reactive           Nonstress Test Second Interpretation: Reactive                     Additional Comments       Physician Evaluation      NST Interpretation: Reactive    Disposition:   Discharged    Comments:        Willa Dahl DO

## 2023-04-17 NOTE — PROGRESS NOTES
Pt is a 34year old female admitted to TRG4/[facility]-A. Patient presents with:  R/o Rom: Leaking clear fluid over the last few days, describes more discharge than usual. No vaginal bleeding, no abdominal pain/contractions. Decreased fetal movement, last felt infant moving last night before bed. Pt is  38w1d intra-uterine pregnancy. History obtained, consents signed. Oriented to room, staff, and plan of care. EFM tested and applied. Abdomen soft and non-tender. Active fetal movement per patient report.

## 2023-04-17 NOTE — PROGRESS NOTES
Discharge instructions reviewed with patient at bedside. Verbal & written education provided and patient understands importance of keeping follow-up appointments. IV Iron infusion scheduled on 04/19, return OB appointment on 04/20, and Oxytocin IOL in Labor & Delivery on 04/23 @0800am. All questions/concerns addressed and clinic contact information provided. Strict return to [facility] Triage instructions provided.     Lacie Griffiths RN 4/17/2023 2:00 PM

## 2023-04-17 NOTE — DISCHARGE INSTRUCTIONS
Discharge Instructions    Diet: Regular  Activity: Normal activity  Restrictions: No exercising; No lifting      General Instructions    Call your OB doctor if: Fluid leaking from your vagina;Uterine contractions increasing in intensity and frequency;Vaginal bleeding;Vaginal or rectal pressure;Uterine contractions 10 minutes or closer for 1 to 2 hours;Decrease in fetal movement; Temperature greater than 100F    Early labor comfort measures: Drink fluids and eat small light meals;Relax, sleep, take a warm bath or shower for 30 minutes or less; Take a walk

## 2023-04-19 ENCOUNTER — OFFICE VISIT (OUTPATIENT)
Dept: HEMATOLOGY/ONCOLOGY | Facility: HOSPITAL | Age: 33
End: 2023-04-19
Attending: OBSTETRICS & GYNECOLOGY
Payer: COMMERCIAL

## 2023-04-19 ENCOUNTER — TELEPHONE (OUTPATIENT)
Facility: CLINIC | Age: 33
End: 2023-04-19

## 2023-04-19 VITALS
HEART RATE: 77 BPM | DIASTOLIC BLOOD PRESSURE: 76 MMHG | OXYGEN SATURATION: 100 % | TEMPERATURE: 98 F | SYSTOLIC BLOOD PRESSURE: 120 MMHG | RESPIRATION RATE: 16 BRPM

## 2023-04-19 DIAGNOSIS — O99.013 ANEMIA COMPLICATING PREGNANCY IN THIRD TRIMESTER: Primary | ICD-10-CM

## 2023-04-19 PROCEDURE — 96374 THER/PROPH/DIAG INJ IV PUSH: CPT

## 2023-04-19 RX ORDER — METHYLPREDNISOLONE SODIUM SUCCINATE 125 MG/2ML
125 INJECTION, POWDER, LYOPHILIZED, FOR SOLUTION INTRAMUSCULAR; INTRAVENOUS ONCE
OUTPATIENT
Start: 2023-04-21

## 2023-04-19 RX ORDER — FAMOTIDINE 10 MG/ML
20 INJECTION, SOLUTION INTRAVENOUS ONCE
OUTPATIENT
Start: 2023-04-21

## 2023-04-19 RX ORDER — METHYLPREDNISOLONE SODIUM SUCCINATE 40 MG/ML
40 INJECTION, POWDER, LYOPHILIZED, FOR SOLUTION INTRAMUSCULAR; INTRAVENOUS ONCE
OUTPATIENT
Start: 2023-04-21

## 2023-04-19 RX ORDER — DIPHENHYDRAMINE HYDROCHLORIDE 50 MG/ML
25 INJECTION INTRAMUSCULAR; INTRAVENOUS ONCE
OUTPATIENT
Start: 2023-04-21

## 2023-04-19 NOTE — TELEPHONE ENCOUNTER
Spoke with patient. Cramping still 6 minutes apart and mild. She does not feel they are strong enough to go to the hospital. Will continue to monitor, drink some water and relax. If the contractions become stronger to call us back. Understanding verbalized.

## 2023-04-19 NOTE — PROGRESS NOTES
Pt here for iron infusion. Arrives Ambulating independently, accompanied by Other self           Modifications in dose or schedule: No     Frequency of blood return and site check throughout administration: Prior to administration   Discharged to Home, Ambulating independently, accompanied by: Other self    Outpatient Oncology Care Plan  Problem list:  anemia  Problems related to:    disease/disease progression  Interventions:  administered iron  Expected outcomes:  optimal lab values  Progress towards outcome:  making progress    Education Record    Learner:  Patient  Barriers / Limitations:  None  Method:  Brief focused  Outcome:  Shows understanding  Comments:observed pt 30 min post infusion. Pt tolerated infusion. No c/o. VSS. See flowsheet. Pt requested future appt to be cancelled. Pt stated she will be induced on Sunday and cannot come thurs or Fri for iron.  Pt will inform ordering md.

## 2023-04-19 NOTE — TELEPHONE ENCOUNTER
Spoke with patient. She was monitored in L&D on 4/17/23 for possible ROM. NST reactive and was sent home. She feels she is bell today. Not sure of the timing she thinks every 6-7 minutes, mild and has lost her mucous plug. No spotting/bleeding and no leaking of fluid. Instructed to monitor timing of contractions and I will call her back in 20 minutes. Understanding verbalized.

## 2023-04-20 ENCOUNTER — ROUTINE PRENATAL (OUTPATIENT)
Facility: CLINIC | Age: 33
End: 2023-04-20
Payer: COMMERCIAL

## 2023-04-20 ENCOUNTER — TELEPHONE (OUTPATIENT)
Facility: CLINIC | Age: 33
End: 2023-04-20

## 2023-04-20 VITALS
BODY MASS INDEX: 30.64 KG/M2 | HEART RATE: 94 BPM | WEIGHT: 152 LBS | DIASTOLIC BLOOD PRESSURE: 81 MMHG | SYSTOLIC BLOOD PRESSURE: 116 MMHG | HEIGHT: 59 IN

## 2023-04-20 DIAGNOSIS — Z3A.38 38 WEEKS GESTATION OF PREGNANCY: ICD-10-CM

## 2023-04-20 DIAGNOSIS — Z34.83 PRENATAL CARE, SUBSEQUENT PREGNANCY, THIRD TRIMESTER: Primary | ICD-10-CM

## 2023-04-20 PROCEDURE — 3074F SYST BP LT 130 MM HG: CPT | Performed by: OBSTETRICS & GYNECOLOGY

## 2023-04-20 PROCEDURE — 3008F BODY MASS INDEX DOCD: CPT | Performed by: OBSTETRICS & GYNECOLOGY

## 2023-04-20 PROCEDURE — 59426 ANTEPARTUM CARE ONLY: CPT | Performed by: OBSTETRICS & GYNECOLOGY

## 2023-04-20 PROCEDURE — 81002 URINALYSIS NONAUTO W/O SCOPE: CPT | Performed by: OBSTETRICS & GYNECOLOGY

## 2023-04-20 PROCEDURE — 3079F DIAST BP 80-89 MM HG: CPT | Performed by: OBSTETRICS & GYNECOLOGY

## 2023-04-20 NOTE — TELEPHONE ENCOUNTER
Pt chipped a tooth and would like to know if its ok for her to see the dentist; pt is having pain; pls call

## 2023-04-20 NOTE — TELEPHONE ENCOUNTER
Spoke with patient. She is aware she may see the dentist. Communication letter sent to her through her mychart. Understanding verbalized.

## 2023-04-23 ENCOUNTER — ANESTHESIA (OUTPATIENT)
Dept: OBGYN UNIT | Facility: HOSPITAL | Age: 33
End: 2023-04-23
Payer: COMMERCIAL

## 2023-04-23 ENCOUNTER — HOSPITAL ENCOUNTER (INPATIENT)
Facility: HOSPITAL | Age: 33
LOS: 1 days | Discharge: HOME | End: 2023-04-24
Attending: OBSTETRICS & GYNECOLOGY | Admitting: OBSTETRICS & GYNECOLOGY
Payer: COMMERCIAL

## 2023-04-23 ENCOUNTER — ANESTHESIA EVENT (OUTPATIENT)
Dept: OBGYN UNIT | Facility: HOSPITAL | Age: 33
End: 2023-04-23
Payer: COMMERCIAL

## 2023-04-23 ENCOUNTER — APPOINTMENT (OUTPATIENT)
Dept: OBGYN CLINIC | Facility: HOSPITAL | Age: 33
End: 2023-04-23
Payer: COMMERCIAL

## 2023-04-23 PROBLEM — O99.013 ANEMIA DURING PREGNANCY IN THIRD TRIMESTER (HCC): Status: ACTIVE | Noted: 2023-04-14

## 2023-04-23 PROBLEM — Z98.890 STATUS POST INDUCTION OF LABOR: Status: ACTIVE | Noted: 2023-04-23

## 2023-04-23 PROBLEM — D75.839 THROMBOCYTOSIS: Status: ACTIVE | Noted: 2023-04-23

## 2023-04-23 PROBLEM — Z34.90 PREGNANCY (HCC): Status: ACTIVE | Noted: 2023-04-23

## 2023-04-23 PROBLEM — O99.013 ANEMIA DURING PREGNANCY IN THIRD TRIMESTER: Status: ACTIVE | Noted: 2023-04-14

## 2023-04-23 PROBLEM — Z34.90 PREGNANCY: Status: ACTIVE | Noted: 2023-04-23

## 2023-04-23 LAB
ANTIBODY SCREEN: NEGATIVE
BASOPHILS # BLD AUTO: 0.06 X10(3) UL (ref 0–0.2)
BASOPHILS NFR BLD AUTO: 0.6 %
DEPRECATED HBV CORE AB SER IA-ACNC: 155.1 NG/ML
EOSINOPHIL # BLD AUTO: 0.15 X10(3) UL (ref 0–0.7)
EOSINOPHIL NFR BLD AUTO: 1.4 %
ERYTHROCYTE [DISTWIDTH] IN BLOOD BY AUTOMATED COUNT: 18.8 %
HCT VFR BLD AUTO: 32.9 %
HGB BLD-MCNC: 10.3 G/DL
IMM GRANULOCYTES # BLD AUTO: 0.15 X10(3) UL (ref 0–1)
IMM GRANULOCYTES NFR BLD: 1.4 %
LYMPHOCYTES # BLD AUTO: 3.39 X10(3) UL (ref 1–4)
LYMPHOCYTES NFR BLD AUTO: 31.3 %
MCH RBC QN AUTO: 22.5 PG (ref 26–34)
MCHC RBC AUTO-ENTMCNC: 31.3 G/DL (ref 31–37)
MCV RBC AUTO: 71.8 FL
MONOCYTES # BLD AUTO: 0.59 X10(3) UL (ref 0.1–1)
MONOCYTES NFR BLD AUTO: 5.4 %
NEUTROPHILS # BLD AUTO: 6.49 X10 (3) UL (ref 1.5–7.7)
NEUTROPHILS # BLD AUTO: 6.49 X10(3) UL (ref 1.5–7.7)
NEUTROPHILS NFR BLD AUTO: 59.9 %
PLATELET # BLD AUTO: 509 10(3)UL (ref 150–450)
RBC # BLD AUTO: 4.58 X10(6)UL
RH BLOOD TYPE: POSITIVE
T PALLIDUM AB SER QL IA: NONREACTIVE
WBC # BLD AUTO: 10.8 X10(3) UL (ref 4–11)

## 2023-04-23 PROCEDURE — 59410 OBSTETRICAL CARE: CPT | Performed by: OBSTETRICS & GYNECOLOGY

## 2023-04-23 PROCEDURE — 3E033VJ INTRODUCTION OF OTHER HORMONE INTO PERIPHERAL VEIN, PERCUTANEOUS APPROACH: ICD-10-PCS | Performed by: OBSTETRICS & GYNECOLOGY

## 2023-04-23 PROCEDURE — 10907ZC DRAINAGE OF AMNIOTIC FLUID, THERAPEUTIC FROM PRODUCTS OF CONCEPTION, VIA NATURAL OR ARTIFICIAL OPENING: ICD-10-PCS | Performed by: OBSTETRICS & GYNECOLOGY

## 2023-04-23 RX ORDER — FLUTICASONE FUROATE AND VILANTEROL 200; 25 UG/1; UG/1
1 POWDER RESPIRATORY (INHALATION) DAILY
Status: DISCONTINUED | OUTPATIENT
Start: 2023-04-23 | End: 2023-04-24

## 2023-04-23 RX ORDER — DEXTROSE, SODIUM CHLORIDE, SODIUM LACTATE, POTASSIUM CHLORIDE, AND CALCIUM CHLORIDE 5; .6; .31; .03; .02 G/100ML; G/100ML; G/100ML; G/100ML; G/100ML
INJECTION, SOLUTION INTRAVENOUS AS NEEDED
Status: DISCONTINUED | OUTPATIENT
Start: 2023-04-23 | End: 2023-04-23

## 2023-04-23 RX ORDER — METHYLERGONOVINE MALEATE 0.2 MG/ML
INJECTION INTRAVENOUS
Status: COMPLETED
Start: 2023-04-23 | End: 2023-04-23

## 2023-04-23 RX ORDER — PANTOPRAZOLE SODIUM 40 MG/1
40 TABLET, DELAYED RELEASE ORAL
Status: DISCONTINUED | OUTPATIENT
Start: 2023-04-23 | End: 2023-04-24

## 2023-04-23 RX ORDER — ACETAMINOPHEN 500 MG
500 TABLET ORAL EVERY 6 HOURS PRN
Status: DISCONTINUED | OUTPATIENT
Start: 2023-04-23 | End: 2023-04-24

## 2023-04-23 RX ORDER — BISACODYL 10 MG
10 SUPPOSITORY, RECTAL RECTAL ONCE AS NEEDED
Status: DISCONTINUED | OUTPATIENT
Start: 2023-04-23 | End: 2023-04-24

## 2023-04-23 RX ORDER — ACETAMINOPHEN 500 MG
1000 TABLET ORAL EVERY 6 HOURS PRN
Status: DISCONTINUED | OUTPATIENT
Start: 2023-04-23 | End: 2023-04-24

## 2023-04-23 RX ORDER — IBUPROFEN 600 MG/1
600 TABLET ORAL EVERY 6 HOURS
Status: DISCONTINUED | OUTPATIENT
Start: 2023-04-23 | End: 2023-04-24

## 2023-04-23 RX ORDER — DOCUSATE SODIUM 100 MG/1
100 CAPSULE, LIQUID FILLED ORAL
Status: DISCONTINUED | OUTPATIENT
Start: 2023-04-23 | End: 2023-04-24

## 2023-04-23 RX ORDER — MONTELUKAST SODIUM 10 MG/1
10 TABLET ORAL NIGHTLY
Status: DISCONTINUED | OUTPATIENT
Start: 2023-04-23 | End: 2023-04-24

## 2023-04-23 RX ORDER — TERBUTALINE SULFATE 1 MG/ML
0.25 INJECTION, SOLUTION SUBCUTANEOUS AS NEEDED
Status: DISCONTINUED | OUTPATIENT
Start: 2023-04-23 | End: 2023-04-23

## 2023-04-23 RX ORDER — ONDANSETRON 2 MG/ML
4 INJECTION INTRAMUSCULAR; INTRAVENOUS EVERY 6 HOURS PRN
Status: DISCONTINUED | OUTPATIENT
Start: 2023-04-23 | End: 2023-04-23

## 2023-04-23 RX ORDER — BUPIVACAINE HCL/0.9 % NACL/PF 0.25 %
5 PLASTIC BAG, INJECTION (ML) EPIDURAL AS NEEDED
Status: DISCONTINUED | OUTPATIENT
Start: 2023-04-23 | End: 2023-04-23

## 2023-04-23 RX ORDER — SODIUM CHLORIDE, SODIUM LACTATE, POTASSIUM CHLORIDE, CALCIUM CHLORIDE 600; 310; 30; 20 MG/100ML; MG/100ML; MG/100ML; MG/100ML
INJECTION, SOLUTION INTRAVENOUS CONTINUOUS
Status: DISCONTINUED | OUTPATIENT
Start: 2023-04-23 | End: 2023-04-23

## 2023-04-23 RX ORDER — METHYLERGONOVINE MALEATE 0.2 MG/ML
0.2 INJECTION INTRAVENOUS ONCE
Status: COMPLETED | OUTPATIENT
Start: 2023-04-23 | End: 2023-04-23

## 2023-04-23 RX ORDER — NALBUPHINE HCL 10 MG/ML
2.5 AMPUL (ML) INJECTION
Status: DISCONTINUED | OUTPATIENT
Start: 2023-04-23 | End: 2023-04-23

## 2023-04-23 RX ORDER — SIMETHICONE 80 MG
80 TABLET,CHEWABLE ORAL 3 TIMES DAILY PRN
Status: DISCONTINUED | OUTPATIENT
Start: 2023-04-23 | End: 2023-04-24

## 2023-04-23 RX ORDER — ACETAMINOPHEN 500 MG
1000 TABLET ORAL EVERY 6 HOURS PRN
Status: DISCONTINUED | OUTPATIENT
Start: 2023-04-23 | End: 2023-04-23

## 2023-04-23 RX ORDER — DIPHENHYDRAMINE HCL 25 MG
25 CAPSULE ORAL EVERY 6 HOURS PRN
Status: DISCONTINUED | OUTPATIENT
Start: 2023-04-23 | End: 2023-04-24

## 2023-04-23 RX ORDER — ACETAMINOPHEN 500 MG
500 TABLET ORAL EVERY 6 HOURS PRN
Status: DISCONTINUED | OUTPATIENT
Start: 2023-04-23 | End: 2023-04-23

## 2023-04-23 RX ORDER — METOCLOPRAMIDE HYDROCHLORIDE 5 MG/ML
10 INJECTION INTRAMUSCULAR; INTRAVENOUS EVERY 6 HOURS PRN
Status: DISCONTINUED | OUTPATIENT
Start: 2023-04-23 | End: 2023-04-23

## 2023-04-23 RX ORDER — BUPIVACAINE HYDROCHLORIDE 2.5 MG/ML
INJECTION, SOLUTION EPIDURAL; INFILTRATION; INTRACAUDAL AS NEEDED
Status: DISCONTINUED | OUTPATIENT
Start: 2023-04-23 | End: 2023-04-23 | Stop reason: SURG

## 2023-04-23 RX ORDER — ALBUTEROL SULFATE 90 UG/1
2 AEROSOL, METERED RESPIRATORY (INHALATION) EVERY 6 HOURS PRN
Status: DISCONTINUED | OUTPATIENT
Start: 2023-04-23 | End: 2023-04-24

## 2023-04-23 RX ORDER — TRISODIUM CITRATE DIHYDRATE AND CITRIC ACID MONOHYDRATE 500; 334 MG/5ML; MG/5ML
30 SOLUTION ORAL AS NEEDED
Status: DISCONTINUED | OUTPATIENT
Start: 2023-04-23 | End: 2023-04-23

## 2023-04-23 RX ADMIN — BUPIVACAINE HYDROCHLORIDE 6 ML: 2.5 INJECTION, SOLUTION EPIDURAL; INFILTRATION; INTRACAUDAL at 10:54:00

## 2023-04-23 NOTE — PLAN OF CARE
Problem: BIRTH - VAGINAL/ SECTION  Goal: Fetal and maternal status remain reassuring during the birth process  Description: INTERVENTIONS:  - Monitor vital signs  - Monitor fetal heart rate  - Monitor uterine activity  - Monitor labor progression (vaginal delivery)  - DVT prophylaxis (C/S delivery)  - Surgical antibiotic prophylaxis (C/S delivery)  Outcome: Progressing     Problem: PAIN - ADULT  Goal: Verbalizes/displays adequate comfort level or patient's stated pain goal  Description: INTERVENTIONS:  - Encourage pt to monitor pain and request assistance  - Assess pain using appropriate pain scale  - Administer analgesics based on type and severity of pain and evaluate response  - Implement non-pharmacological measures as appropriate and evaluate response  - Consider cultural and social influences on pain and pain management  - Manage/alleviate anxiety  - Utilize distraction and/or relaxation techniques  - Monitor for opioid side effects  - Notify MD/LIP if interventions unsuccessful or patient reports new pain  - Anticipate increased pain with activity and pre-medicate as appropriate  Outcome: Progressing     Problem: ANXIETY  Goal: Will report anxiety at manageable levels  Description: INTERVENTIONS:  - Administer medication as ordered  - Teach and rehearse alternative coping skills  - Provide emotional support with 1:1 interaction with staff  Outcome: Progressing     Problem: Patient/Family Goals  Goal: Patient/Family Long Term Goal  Description: Patient's Long Term Goal: Uncomplicated and safe delivery    Interventions:  - See additional Care Plan goals for specific interventions  Outcome: Progressing  Goal: Patient/Family Short Term Goal  Description: Patient's Short Term Goal: Adequate pain control    Interventions:   - See additional Care Plan goals for specific interventions  Outcome: Progressing

## 2023-04-23 NOTE — L&D DELIVERY NOTE
[address] [[id number]]    Labor Events     labor?: No   steroids?: None  Antibiotics received during labor?: No  Antibiotics (enter # doses in comment): none  Rupture date/time: 2023 0927     Rupture type: AROM  Fluid color: Clear  Induction: Oxytocin, AROM  Indications for induction: Elective  Intrapartum & labor complications: None     Labor Length    1st stage: 4h 28m  2nd stage: 0h 40m  3rd stage: 0h 01m     Labor Event Times    Labor onset date/time: 2023 1055  Dilation complete date/time: 2023 1523  Start pushing date/time: 2023 1537     [place] Presentation    Presentation: Vertex  Position: Occiput Anterior     Operative Delivery    Operative Vaginal Delivery: No           Shoulder Dystocia    Shoulder Dystocia: No     Anesthesia    Method: Epidural           Delivery    Head delivery date/time: 2023 16:03:22   Delivery date/time:  23 16:03:55   Delivery type: Normal spontaneous vaginal delivery    Details:        Delivery location: delivery room      Delivery Providers    Delivering Clinician: Cameron Davis DO   Delivery personnel:  Provider Role   Claudia Streeter, RN Baby Nurse   Salvatore Treadwell, RN Delivery Nurse         Cord    Vessels: 3 Vessels  Complications: None  Timed cord clamping: Yes  Time in sec: 30  Cord blood disposition: to lab  Gases sent?: No     Resuscitation    Method: Suctioning     [place] Measurements    Weight: 3240 g 7 lb 2.3 oz Length: 50.8 cm   Head circum.: 33 cm Chest circum.: 32 cm      Abdominal circum.: 31 cm       Placenta    Date/time: 2023 1605  Removal: Spontaneous  Appearance: Intact  Disposition: held for future pathology     Apgars    Living status: Living   Apgar Scoring Key:    0 1 2    Skin color Blue or pale Acrocyanotic Completely pink    Heart rate Absent <100 bpm >100 bpm    Reflex irritability No response Grimace Cry or active withdrawal    Muscle tone Limp Some flexion Active motion Respiratory effort Absent Weak cry; hypoventilation Good, crying            1 Minute:  5 Minute:  10 Minute:  15 Minute:  20 Minute:    Skin color: 1  1       Heart rate: 2  2       Reflex irritablity: 2  2       Muscle tone: 2  2       Respiratory effort: 2  2       Total: 9  9           Apgars assigned by: ASHISH BELTRAN RN    disposition: with mother         Skin to Skin    Skin to skin initiated date/time: 2023 1620  Skin to skin with: Mother     Vaginal Count    Initial count RN: Melquiades Mancini RN  Initial count Tech: [place] Ari   Sponges   Sharps    Initial counts 11   0    Final counts 11   0    Final count RN: Melquiades Mancini RN  Final count MD: Aleena Reynolds,      Delivery (Maternal)    Episiotomy: None  Perineal lacerations: None    Vaginal laceration?: No    Cervical laceration?: No    Clitoral laceration?: No    Quantitative blood loss (mL): 34          34 y.o  at 44 weeks with  male infant, APGARS 9/9, weight  7lb2oz. Bulb suctioned at perineum, Shoulder dystocia right shoulder anterior relieved with Jeff and wood screw maneuvers . Cord clamped x2 and cut after 30 seconds. Placenta delivered spontaneously, intact.  Intact perineum, QBL 34

## 2023-04-23 NOTE — PLAN OF CARE
Problem: SAFETY ADULT - FALL  Goal: Free from fall injury  Description: INTERVENTIONS:  - Assess pt frequently for physical needs  - Identify cognitive and physical deficits and behaviors that affect risk of falls.   - [place] fall precautions as indicated by assessment.  - Educate pt/family on patient safety including physical limitations  - Instruct pt to call for assistance with activity based on assessment  - Modify environment to reduce risk of injury  - Provide assistive devices as appropriate  - Consider OT/PT consult to assist with strengthening/mobility  - Encourage toileting schedule  Outcome: Progressing     Problem: BIRTH - VAGINAL/ SECTION  Goal: Fetal and maternal status remain reassuring during the birth process  Description: INTERVENTIONS:  - Monitor vital signs  - Monitor fetal heart rate  - Monitor uterine activity  - Monitor labor progression (vaginal delivery)  - DVT prophylaxis (C/S delivery)  - Surgical antibiotic prophylaxis (C/S delivery)  Outcome: Completed     Problem: PAIN - ADULT  Goal: Verbalizes/displays adequate comfort level or patient's stated pain goal  Description: INTERVENTIONS:  - Encourage pt to monitor pain and request assistance  - Assess pain using appropriate pain scale  - Administer analgesics based on type and severity of pain and evaluate response  - Implement non-pharmacological measures as appropriate and evaluate response  - Consider cultural and social influences on pain and pain management  - Manage/alleviate anxiety  - Utilize distraction and/or relaxation techniques  - Monitor for opioid side effects  - Notify MD/LIP if interventions unsuccessful or patient reports new pain  - Anticipate increased pain with activity and pre-medicate as appropriate  Outcome: Completed     Problem: ANXIETY  Goal: Will report anxiety at manageable levels  Description: INTERVENTIONS:  - Administer medication as ordered  - Teach and rehearse alternative coping skills  - Provide emotional support with 1:1 interaction with staff  Outcome: Completed     Problem: Patient/Family Goals  Goal: Patient/Family Long Term Goal  Description: Patient's Long Term Goal: Uncomplicated and safe delivery    Interventions:  - See additional Care Plan goals for specific interventions  Outcome: Completed  Goal: Patient/Family Short Term Goal  Description: Patient's Short Term Goal: Adequate pain control    Interventions:   - See additional Care Plan goals for specific interventions  Outcome: Completed

## 2023-04-23 NOTE — PLAN OF CARE

## 2023-04-23 NOTE — ANESTHESIA PROCEDURE NOTES
Labor Analgesia    Date/Time: 4/23/2023 10:42 AM    Performed by: Joseph Alvarado MD  Authorized by: Joseph Alvarado MD      General Information and Staff    Start Time:  4/23/2023 10:42 AM  End Time:  4/23/2023 10:56 AM  Anesthesiologist:  Joseph Alvarado MD  Performed by:   Anesthesiologist  Patient Location:  OB  Site Identification: surface landmarks    Reason for Block: labor epidural    Preanesthetic Checklist: patient identified, IV checked, risks and benefits discussed, monitors and equipment checked, pre-op evaluation, timeout performed, IV bolus, anesthesia consent and sterile technique used      Procedure Details    Patient Position:  Sitting  Prep: Betadine    Monitoring:  Heart rate and continuous pulse ox  Approach:  Midline    Epidural Needle    Injection Technique:  PREM air  Needle Type:  Tuohy  Needle Gauge:  17 G  Needle Length:  3.375 in  Needle Insertion Depth:  4  Location:  L4-5    Spinal Needle      Catheter    Catheter Type:  End hole  Catheter Size:  19 G  Catheter at Skin Depth:  11  Test Dose:  Negative    Assessment      Additional Comments       Test dose neg

## 2023-04-23 NOTE — PROGRESS NOTES
Patient transferred to mother/baby room 2209 per cart in stable condition with baby and personal belongings. Accompanied by  and staff. Bands matched and verified with mother/baby RN and family. Report given to mother/baby RN.

## 2023-04-24 ENCOUNTER — APPOINTMENT (OUTPATIENT)
Dept: HEMATOLOGY/ONCOLOGY | Facility: HOSPITAL | Age: 33
End: 2023-04-24
Attending: OBSTETRICS & GYNECOLOGY
Payer: COMMERCIAL

## 2023-04-24 VITALS
SYSTOLIC BLOOD PRESSURE: 113 MMHG | TEMPERATURE: 98 F | BODY MASS INDEX: 30.64 KG/M2 | DIASTOLIC BLOOD PRESSURE: 63 MMHG | WEIGHT: 152 LBS | OXYGEN SATURATION: 100 % | HEART RATE: 57 BPM | RESPIRATION RATE: 16 BRPM | HEIGHT: 59 IN

## 2023-04-24 LAB
BASOPHILS # BLD AUTO: 0.07 X10(3) UL (ref 0–0.2)
BASOPHILS NFR BLD AUTO: 0.4 %
EOSINOPHIL # BLD AUTO: 0.15 X10(3) UL (ref 0–0.7)
EOSINOPHIL NFR BLD AUTO: 0.9 %
ERYTHROCYTE [DISTWIDTH] IN BLOOD BY AUTOMATED COUNT: 18.5 %
HCT VFR BLD AUTO: 30.6 %
HGB BLD-MCNC: 9.4 G/DL
IMM GRANULOCYTES # BLD AUTO: 0.11 X10(3) UL (ref 0–1)
IMM GRANULOCYTES NFR BLD: 0.7 %
LYMPHOCYTES # BLD AUTO: 3.86 X10(3) UL (ref 1–4)
LYMPHOCYTES NFR BLD AUTO: 23.6 %
MCH RBC QN AUTO: 22.1 PG (ref 26–34)
MCHC RBC AUTO-ENTMCNC: 30.7 G/DL (ref 31–37)
MCV RBC AUTO: 72 FL
MONOCYTES # BLD AUTO: 0.99 X10(3) UL (ref 0.1–1)
MONOCYTES NFR BLD AUTO: 6.1 %
NEUTROPHILS # BLD AUTO: 11.16 X10 (3) UL (ref 1.5–7.7)
NEUTROPHILS # BLD AUTO: 11.16 X10(3) UL (ref 1.5–7.7)
NEUTROPHILS NFR BLD AUTO: 68.3 %
PLATELET # BLD AUTO: 464 10(3)UL (ref 150–450)
RBC # BLD AUTO: 4.25 X10(6)UL
WBC # BLD AUTO: 16.3 X10(3) UL (ref 4–11)

## 2023-04-24 NOTE — PLAN OF CARE
Problem: SAFETY ADULT - FALL  Goal: Free from fall injury  Description: INTERVENTIONS:  - Assess pt frequently for physical needs  - Identify cognitive and physical deficits and behaviors that affect risk of falls. - [place] fall precautions as indicated by assessment.  - Educate pt/family on patient safety including physical limitations  - Instruct pt to call for assistance with activity based on assessment  - Modify environment to reduce risk of injury  - Provide assistive devices as appropriate  - Consider OT/PT consult to assist with strengthening/mobility  - Encourage toileting schedule  Outcome: Progressing     Problem: POSTPARTUM  Goal: Long Term Goal:Experiences normal postpartum course  Description: INTERVENTIONS:  - Assess and monitor vital signs and lab values. - Assess fundus and lochia. - Provide ice/sitz baths for perineum discomfort. - Monitor healing of incision/episiotomy/laceration, and assess for signs and symptoms of infection and hematoma. - Assess bladder function and monitor for bladder distention.  - Provide/instruct/assist with pericare as needed. - Provide VTE prophylaxis as needed. - Monitor bowel function.  - Encourage ambulation and provide assistance as needed. - Assess and monitor emotional status and provide social service/psych resources as needed. - Utilize standard precautions and use personal protective equipment as indicated. Ensure aseptic care of all intravenous lines and invasive tubes/drains.  - Obtain immunization and exposure to communicable diseases history. Outcome: Progressing  Goal: Optimize infant feeding at the breast  Description: INTERVENTIONS:  - Initiate breast feeding within first hour after birth. - Monitor effectiveness of current breast feeding efforts. - Assess support systems available to mother/family.  - Identify cultural beliefs/practices regarding lactation, letdown techniques, maternal food preferences.   - Assess mother's knowledge and previous experience with breast feeding.  - Provide information as needed about early infant feeding cues (e.g., rooting, lip smacking, sucking fingers/hand) versus late cue of crying.  - Discuss/demonstrate breast feeding aids (e.g., infant sling, nursing footstool/pillows, and breast pumps). - Encourage mother/other family members to express feelings/concerns, and actively listen. - Educate father/SO about benefits of breast feeding and how to manage common lactation challenges. - Recommend avoidance of specific medications or substances incompatible with breast feeding.  - Assess and monitor for signs of nipple pain/trauma. - Instruct and provide assistance with proper latch. - Review techniques for milk expression (breast pumping) and storage of breast milk. Provide pumping equipment/supplies, instructions and assistance, as needed. - Encourage rooming-in and breast feeding on demand.  - Encourage skin-to-skin contact. - Provide LC support as needed. - Assess for and manage engorgement. - Provide breast feeding education handouts and information on community breast feeding support. Outcome: Progressing  Goal: Appropriate maternal -  bonding  Description: INTERVENTIONS:  - Assess caregiver- interactions. - Assess caregiver's emotional status and coping mechanisms. - Encourage caregiver to participate in  daily care. - Assess support systems available to mother/family.  - Provide /case management support as needed.   Outcome: Progressing

## 2023-04-24 NOTE — PLAN OF CARE
Problem: SAFETY ADULT - FALL  Goal: Free from fall injury  Description: INTERVENTIONS:  - Assess pt frequently for physical needs  - Identify cognitive and physical deficits and behaviors that affect risk of falls. - [place] fall precautions as indicated by assessment.  - Educate pt/family on patient safety including physical limitations  - Instruct pt to call for assistance with activity based on assessment  - Modify environment to reduce risk of injury  - Provide assistive devices as appropriate  - Consider OT/PT consult to assist with strengthening/mobility  - Encourage toileting schedule  Outcome: Progressing     Problem: POSTPARTUM  Goal: Long Term Goal:Experiences normal postpartum course  Description: INTERVENTIONS:  - Assess and monitor vital signs and lab values. - Assess fundus and lochia. - Provide ice/sitz baths for perineum discomfort. - Monitor healing of incision/episiotomy/laceration, and assess for signs and symptoms of infection and hematoma. - Assess bladder function and monitor for bladder distention.  - Provide/instruct/assist with pericare as needed. - Provide VTE prophylaxis as needed. - Monitor bowel function.  - Encourage ambulation and provide assistance as needed. - Assess and monitor emotional status and provide social service/psych resources as needed. - Utilize standard precautions and use personal protective equipment as indicated. Ensure aseptic care of all intravenous lines and invasive tubes/drains.  - Obtain immunization and exposure to communicable diseases history. Outcome: Progressing  Goal: Optimize infant feeding at the breast  Description: INTERVENTIONS:  - Initiate breast feeding within first hour after birth. - Monitor effectiveness of current breast feeding efforts. - Assess support systems available to mother/family.  - Identify cultural beliefs/practices regarding lactation, letdown techniques, maternal food preferences.   - Assess mother's knowledge and previous experience with breast feeding.  - Provide information as needed about early infant feeding cues (e.g., rooting, lip smacking, sucking fingers/hand) versus late cue of crying.  - Discuss/demonstrate breast feeding aids (e.g., infant sling, nursing footstool/pillows, and breast pumps). - Encourage mother/other family members to express feelings/concerns, and actively listen. - Educate father/SO about benefits of breast feeding and how to manage common lactation challenges. - Recommend avoidance of specific medications or substances incompatible with breast feeding.  - Assess and monitor for signs of nipple pain/trauma. - Instruct and provide assistance with proper latch. - Review techniques for milk expression (breast pumping) and storage of breast milk. Provide pumping equipment/supplies, instructions and assistance, as needed. - Encourage rooming-in and breast feeding on demand.  - Encourage skin-to-skin contact. - Provide LC support as needed. - Assess for and manage engorgement. - Provide breast feeding education handouts and information on community breast feeding support. Outcome: Progressing  Goal: Establishment of adequate milk supply with medication/procedure interruptions  Description: INTERVENTIONS:  - Review techniques for milk expression (breast pumping). - Provide pumping equipment/supplies, instructions, and assistance until it is safe to breastfeed infant. Outcome: Progressing  Goal: Appropriate maternal -  bonding  Description: INTERVENTIONS:  - Assess caregiver- interactions. - Assess caregiver's emotional status and coping mechanisms. - Encourage caregiver to participate in  daily care. - Assess support systems available to mother/family.  - Provide /case management support as needed.   Outcome: Progressing

## 2023-04-24 NOTE — PROGRESS NOTES
Discharge patient home as order. Teaching complete, patient feel comfortable in taking care of herself and  infant. Hugs and kisses off, send both mom and infant to their family car @ [phone].

## 2023-04-25 ENCOUNTER — PATIENT OUTREACH (OUTPATIENT)
Dept: CASE MANAGEMENT | Age: 33
End: 2023-04-25

## 2023-04-25 NOTE — PROGRESS NOTES
1st attempt OBGYN Post Partum apt request  (delivered 04/23)    Dr Rolf Good  [facility]  [facility], [address] 87 [phone] 50  Follow up 6 weeks  LVM to call [phone] to assist w/scheduling apt; will try again

## 2023-04-26 ENCOUNTER — APPOINTMENT (OUTPATIENT)
Dept: HEMATOLOGY/ONCOLOGY | Facility: HOSPITAL | Age: 33
End: 2023-04-26
Attending: OBSTETRICS & GYNECOLOGY
Payer: COMMERCIAL

## 2023-04-26 ENCOUNTER — TELEPHONE (OUTPATIENT)
Dept: OBGYN UNIT | Facility: HOSPITAL | Age: 33
End: 2023-04-26

## 2023-04-26 NOTE — PROGRESS NOTES
Reviewed self and infant care w / mom, she verbalizes understanding of instructions reviewed. Encourage to follow up w/ MDs as directed and w/ questions/concerns.  Doing well, no concerns now

## 2023-04-27 ENCOUNTER — TELEPHONE (OUTPATIENT)
Dept: OBGYN UNIT | Facility: HOSPITAL | Age: 33
End: 2023-04-27

## 2023-04-28 ENCOUNTER — APPOINTMENT (OUTPATIENT)
Dept: HEMATOLOGY/ONCOLOGY | Facility: HOSPITAL | Age: 33
End: 2023-04-28
Attending: OBSTETRICS & GYNECOLOGY
Payer: COMMERCIAL

## 2023-06-06 ENCOUNTER — POSTPARTUM (OUTPATIENT)
Facility: CLINIC | Age: 33
End: 2023-06-06
Payer: COMMERCIAL

## 2023-06-06 VITALS
BODY MASS INDEX: 29 KG/M2 | DIASTOLIC BLOOD PRESSURE: 80 MMHG | SYSTOLIC BLOOD PRESSURE: 120 MMHG | WEIGHT: 142.06 LBS | HEART RATE: 74 BPM

## 2023-06-06 DIAGNOSIS — Z30.011 INITIATION OF ORAL CONTRACEPTION: Primary | ICD-10-CM

## 2023-06-06 PROCEDURE — 3079F DIAST BP 80-89 MM HG: CPT

## 2023-06-06 PROCEDURE — 3074F SYST BP LT 130 MM HG: CPT

## 2023-06-06 RX ORDER — ACETAMINOPHEN AND CODEINE PHOSPHATE 120; 12 MG/5ML; MG/5ML
0.35 SOLUTION ORAL DAILY
Qty: 28 TABLET | Refills: 11 | Status: SHIPPED | OUTPATIENT
Start: 2023-06-06 | End: 2024-06-05

## 2024-02-15 ENCOUNTER — OFFICE VISIT (OUTPATIENT)
Facility: CLINIC | Age: 34
End: 2024-02-15
Payer: COMMERCIAL

## 2024-02-15 VITALS
WEIGHT: 156 LBS | DIASTOLIC BLOOD PRESSURE: 68 MMHG | HEIGHT: 59 IN | SYSTOLIC BLOOD PRESSURE: 108 MMHG | HEART RATE: 73 BPM | BODY MASS INDEX: 31.45 KG/M2

## 2024-02-15 DIAGNOSIS — Z30.430 ENCOUNTER FOR INSERTION OF INTRAUTERINE CONTRACEPTIVE DEVICE (IUD): Primary | ICD-10-CM

## 2024-02-15 DIAGNOSIS — Z01.818 PRE-PROCEDURAL EXAMINATION: ICD-10-CM

## 2024-02-15 PROBLEM — O09.299 HX OF PREECLAMPSIA, PRIOR PREGNANCY, CURRENTLY PREGNANT, UNSPECIFIED TRIMESTER: Status: RESOLVED | Noted: 2022-11-14 | Resolved: 2024-02-15

## 2024-02-15 PROBLEM — O09.299 HX OF PREECLAMPSIA, PRIOR PREGNANCY, CURRENTLY PREGNANT, UNSPECIFIED TRIMESTER (HCC): Status: RESOLVED | Noted: 2022-11-14 | Resolved: 2024-02-15

## 2024-02-15 PROBLEM — Z34.90 PREGNANCY (HCC): Status: RESOLVED | Noted: 2023-04-23 | Resolved: 2024-02-15

## 2024-02-15 PROBLEM — O21.9 NAUSEA AND VOMITING IN PREGNANCY PRIOR TO 22 WEEKS GESTATION (HCC): Status: RESOLVED | Noted: 2020-02-05 | Resolved: 2024-02-15

## 2024-02-15 PROBLEM — Z98.890 STATUS POST INDUCTION OF LABOR: Status: RESOLVED | Noted: 2023-04-23 | Resolved: 2024-02-15

## 2024-02-15 PROBLEM — O21.9 NAUSEA AND VOMITING IN PREGNANCY PRIOR TO 22 WEEKS GESTATION: Status: RESOLVED | Noted: 2020-02-05 | Resolved: 2024-02-15

## 2024-02-15 LAB
CONTROL LINE PRESENT WITH A CLEAR BACKGROUND (YES/NO): YES YES/NO
KIT LOT #: NORMAL NUMERIC
PREGNANCY TEST, URINE: NEGATIVE

## 2024-02-15 PROCEDURE — 58300 INSERT INTRAUTERINE DEVICE: CPT | Performed by: OBSTETRICS & GYNECOLOGY

## 2024-02-15 PROCEDURE — 81025 URINE PREGNANCY TEST: CPT | Performed by: OBSTETRICS & GYNECOLOGY

## 2024-02-15 RX ORDER — COPPER 313.4 MG/1
1 INTRAUTERINE DEVICE INTRAUTERINE ONCE
Status: COMPLETED | OUTPATIENT
Start: 2024-02-15 | End: 2024-02-15

## 2024-02-15 RX ADMIN — COPPER 1 DEVICE: 313.4 INTRAUTERINE DEVICE INTRAUTERINE at 15:04:00

## 2024-02-15 NOTE — PROGRESS NOTES
IUD Insertion     Pregnancy Results: negative from urine test   Birth control method(s) used:    LMP: 2/14/24    Consent signed.  Procedure discussed with the patient in detail including indication, risks, benefits, alternatives and complications.    Pelvic Exam Findings:  Uterus: 7    Procedure:  Speculum placed in the vagina.  Betadine wash of vagina and cervix.  Single tooth tenaculum was placed at the 12 o'clock position.  Uterus sounded to 7 cm.  Paragard IUD was placed without difficulty.  Strings cut at 3 cm.  Single tooth tenaculum removed.  Good hemostasis noted.  Patient tolerated procedure well.      Visit Plan:  IUD surveillance was discussed with the patient.

## 2024-02-24 ENCOUNTER — HOSPITAL ENCOUNTER (OUTPATIENT)
Dept: GENERAL RADIOLOGY | Age: 34
Discharge: HOME | End: 2024-02-24
Attending: INTERNAL MEDICINE
Payer: COMMERCIAL

## 2024-02-24 DIAGNOSIS — M79.641 HAND PAIN, RIGHT: ICD-10-CM

## 2024-02-24 DIAGNOSIS — M79.672 LEFT FOOT PAIN: ICD-10-CM

## 2024-02-24 DIAGNOSIS — M79.642 HAND PAIN, LEFT: ICD-10-CM

## 2024-02-24 DIAGNOSIS — M79.671 RIGHT FOOT PAIN: ICD-10-CM

## 2024-02-24 DIAGNOSIS — M53.3 COCCYX PAIN: ICD-10-CM

## 2024-02-24 PROCEDURE — 73630 X-RAY EXAM OF FOOT: CPT | Performed by: INTERNAL MEDICINE

## 2024-02-24 PROCEDURE — 73130 X-RAY EXAM OF HAND: CPT | Performed by: INTERNAL MEDICINE

## 2024-02-24 PROCEDURE — 73502 X-RAY EXAM HIP UNI 2-3 VIEWS: CPT | Performed by: INTERNAL MEDICINE

## 2024-02-24 PROCEDURE — 71045 X-RAY EXAM CHEST 1 VIEW: CPT | Performed by: INTERNAL MEDICINE

## 2024-05-27 ENCOUNTER — APPOINTMENT (OUTPATIENT)
Dept: GENERAL RADIOLOGY | Age: 34
End: 2024-05-27
Attending: EMERGENCY MEDICINE

## 2024-05-27 ENCOUNTER — HOSPITAL ENCOUNTER (OUTPATIENT)
Age: 34
Discharge: HOME | End: 2024-05-27
Attending: EMERGENCY MEDICINE

## 2024-05-27 VITALS
RESPIRATION RATE: 18 BRPM | TEMPERATURE: 97 F | WEIGHT: 150 LBS | HEART RATE: 63 BPM | BODY MASS INDEX: 29.45 KG/M2 | HEIGHT: 60 IN | SYSTOLIC BLOOD PRESSURE: 129 MMHG | OXYGEN SATURATION: 98 % | DIASTOLIC BLOOD PRESSURE: 80 MMHG

## 2024-05-27 DIAGNOSIS — S93.401A MILD SPRAIN OF RIGHT ANKLE, INITIAL ENCOUNTER: Primary | ICD-10-CM

## 2024-05-27 PROCEDURE — 73610 X-RAY EXAM OF ANKLE: CPT | Performed by: EMERGENCY MEDICINE

## 2024-05-27 PROCEDURE — 99214 OFFICE O/P EST MOD 30 MIN: CPT

## 2024-05-27 PROCEDURE — 99213 OFFICE O/P EST LOW 20 MIN: CPT

## 2024-05-27 RX ORDER — METHOTREXATE 2.5 MG/1
2.5 TABLET ORAL WEEKLY
COMMUNITY

## 2024-05-27 NOTE — ED PROVIDER NOTES
Patient Seen in: [facility]      History     Chief Complaint   Patient presents with    Leg or Foot Injury     Entered by patient     Stated Complaint: Leg or Foot Injury    Subjective:   HPI    33-year-old female with a history of asthma, celiac disease fibromyalgia presents to the immediate care for right ankle pain and tenderness.  Patient was ambulating down some steps yesterday when she believes that she rolled her right ankle.  She denies any fall.  She complains of pain and tenderness to the lateral aspect of her ankle.  Denies any obvious bony deformity.  Denies numbness or tingling.  She is able to weight-bear and ambulate.    Objective:   Past Medical History:    Anemia    Arthritis    Asthma (HCC)    Celiac disease (HCC)    Dysmenorrhea    Fibromyalgia    History of blood transfusion    after last delivery    Hx of preeclampsia, prior pregnancy, currently pregnant (HCC)    Irregular menses    Pregnancy-induced hypertension (HCC)    pre-eclampsia with last pregnancy    Rheumatoid arthritis (HCC)              Past Surgical History:   Procedure Laterality Date    Removal gallbladder      09/2020 2 weeks post delivery    [place] teeth removed N/A                 Social History     Socioeconomic History    Marital status:    Tobacco Use    Smoking status: Never    Smokeless tobacco: Never   Vaping Use    Vaping status: Never Used   Substance and Sexual Activity    Alcohol use: Never    Drug use: Never    Sexual activity: Yes     Partners: Male     Social Determinants of Health     Financial Resource Strain: Low Risk  (4/23/2023)    Financial Resource Strain     Difficulty of Paying Living Expenses: Not hard at all     Med Affordability: No   Food Insecurity: No Food Insecurity (4/23/2023)    Food Insecurity     Food Insecurity: Never true   Transportation Needs: No Transportation Needs (4/23/2023)    Transportation Needs     Lack of Transportation: No   Stress: No Stress Concern Present  (4/23/2023)    Stress     Feeling of Stress : No    Received from [facility], [facility]              Review of Systems    Positive for stated complaint: Leg or Foot Injury  Other systems are as noted in HPI.  Constitutional and vital signs reviewed.      All other systems reviewed and negative except as noted above.    Physical Exam     ED Triage Vitals [05/27/24 1318]   /80   Pulse 63   Resp 18   Temp 97.3 °F (36.3 °C)   Temp src Temporal   SpO2 98 %   O2 Device None (Room air)       Current Vitals:   Vital Signs  BP: 129/80  Pulse: 63  Resp: 18  Temp: 97.3 °F (36.3 °C)  Temp src: Temporal    Oxygen Therapy  SpO2: 98 %  O2 Device: None (Room air)            Physical Exam  General: Alert and oriented. No acute distress.  HEENT: Normocephalic. No evidence of trauma. Extraocular movements are intact.  Right ankle: Mild lateral malleoli or tenderness.  No fifth metatarsal tenderness.  Achilles is intact.  Patient has full range of motion without significant pain.  Extremities: No evidence of deformity. No clubbing or cyanosis.  Neuro: No focal deficit is noted.       ED Course   Labs Reviewed - No data to display  Differential includes a right ankle sprain versus fracture.  Right ankle series was ordered for further evaluation  Right ankle series is negative for evidence of fracture or dislocation on my independent review of the images.  Radiologist interpretation to be reviewed:       MDM   Patient was screened and evaluated during this visit.   As a treating physician attending to the patient, I determined, within reasonable clinical confidence and prior to discharge, that an emergency medical condition was not or was no longer present.  There was no indication for further evaluation, treatment or admission on an emergency basis.  Comprehensive verbal and written discharge and follow-up instructions were provided to help prevent relapse or worsening.  Patient was instructed to follow-up with  her primary care provider for further evaluation and treatment, but to return immediately to the ER for worsening, concerning, new, changing or persisting symptoms.  I discussed the case with the patient and they had no questions, complaints, or concerns.  Patient felt comfortable going home.    ^^Please note that this report has been produced using speech recognition software and may contain errors related to that system including, but not limited to, errors in grammar, punctuation, and spelling, as well as words and phrases that possibly may have been recognized inappropriately.  If there are any questions or concerns, contact the dictating provider for clarification                                 Medical Decision Making      Disposition and Plan     Clinical Impression:  1. Mild sprain of right ankle, initial encounter         Disposition:  Discharge  5/27/2024  2:05 pm    Follow-up:  Cassy Olson MD  [address] [place] [id number]  [phone]    Call   As needed, If symptoms worsen          Medications Prescribed:  Discharge Medication List as of 5/27/2024  2:05 PM

## 2024-05-27 NOTE — DISCHARGE INSTRUCTIONS
Rest, ice, elevate your right ankle when at rest  Take ibuprofen as needed for pain  Wear an ankle stirrup splint for comfort and immobilization  Advance activity as tolerated  Return if any worsening symptoms or new concerns

## 2025-03-09 ENCOUNTER — HOSPITAL ENCOUNTER (OUTPATIENT)
Age: 35
Discharge: HOME | End: 2025-03-09
Payer: COMMERCIAL

## 2025-03-09 VITALS
OXYGEN SATURATION: 96 % | TEMPERATURE: 99 F | RESPIRATION RATE: 20 BRPM | HEART RATE: 75 BPM | DIASTOLIC BLOOD PRESSURE: 84 MMHG | HEIGHT: 60 IN | SYSTOLIC BLOOD PRESSURE: 135 MMHG | BODY MASS INDEX: 29.45 KG/M2 | WEIGHT: 150 LBS

## 2025-03-09 DIAGNOSIS — J11.1 INFLUENZA: Primary | ICD-10-CM

## 2025-03-09 LAB
POCT INFLUENZA A: NEGATIVE
POCT INFLUENZA B: POSITIVE
SARS-COV-2 RNA RESP QL NAA+PROBE: NOT DETECTED

## 2025-03-09 PROCEDURE — 87502 INFLUENZA DNA AMP PROBE: CPT | Performed by: NURSE PRACTITIONER

## 2025-03-09 PROCEDURE — 99213 OFFICE O/P EST LOW 20 MIN: CPT

## 2025-03-09 PROCEDURE — 99212 OFFICE O/P EST SF 10 MIN: CPT

## 2025-03-09 RX ORDER — HYDROXYCHLOROQUINE SULFATE 200 MG/1
TABLET, FILM COATED ORAL
COMMUNITY
Start: 2024-03-04

## 2025-03-09 NOTE — ED INITIAL ASSESSMENT (HPI)
Nasal congestion - x 2 days . Pt states she's having RA flare up 3 days ago.  Temp 102.2 wednesday . Takes tylenol

## 2025-03-10 NOTE — ED PROVIDER NOTES
Patient Seen in: [facility]      History     Chief Complaint   Patient presents with    Upper Respiratory Infection     Stated Complaint: Cold    Subjective:   HPI      Patient is a 34-year-old female who is here today with complaints of nasal congestion, not feeling well with a fever that started about 3 days ago.  She reports that she believes she is having an RA flare because she is having bodyaches.  She has been taking Tylenol with some relief  of her symptoms    Objective:     Past Medical History:    Anemia    Arthritis    Asthma (HCC)    Celiac disease (HCC)    Dysmenorrhea    Fibromyalgia    History of blood transfusion    after last delivery    Hx of preeclampsia, prior pregnancy, currently pregnant (HCC)    Irregular menses    Pregnancy-induced hypertension (HCC)    pre-eclampsia with last pregnancy    Rheumatoid arthritis (HCC)              Past Surgical History:   Procedure Laterality Date    Removal gallbladder      09/2020 2 weeks post delivery    [place] teeth removed N/A                 No pertinent social history.            Review of Systems    Positive for stated complaint: Cold  Other systems are as noted in HPI.  Constitutional and vital signs reviewed.      All other systems reviewed and negative except as noted above.    Physical Exam     ED Triage Vitals [03/09/25 1435]   /84   Pulse 75   Resp 20   Temp 98.9 °F (37.2 °C)   Temp src Oral   SpO2 96 %   O2 Device None (Room air)       Current Vitals:   Vital Signs  BP: 135/84  Pulse: 75  Resp: 20  Temp: 98.9 °F (37.2 °C)  Temp src: Oral    Oxygen Therapy  SpO2: 96 %  O2 Device: None (Room air)        Physical Exam  VS: Vital signs reviewed. O2 saturation within normal limits for this patient     General: Patient is awake and alert, oriented to person, place and time. Not in acute distress.      HEENT: Head is normocephalic atraumatic. Pupils reactive bilaterally.  EOMs intact.      Lungs: No respiratory distress  noted    Extremities: No edema.  Pulses 2+ extremities.   Brisk capillary refill noted.      Skin: Normal skin turgor     CNS: Moves all 4 extremities.  Interacts appropriately.  No tremor.  No gait abnormality    Differential diagnosis: Influenza, COVID, pneumonia      ED Course     Labs Reviewed   POCT FLU TEST - Abnormal; Notable for the following components:       Result Value    POCT INFLUENZA B Positive (*)     All other components within normal limits    Narrative:     This assay is a rapid molecular in vitro test utilizing nucleic acid amplification of influenza A and B viral RNA.   RAPID SARS-COV-2 BY PCR - Normal            I have personally  reviewed available prior medical records for any recent pertinent discharge summaries/testing. Patient/family updated on results and plan, a verbalized understanding and agreement with the plan.  I explained to the patient that emergent conditions may arise and to go to the ER for new, worsening or any persistent conditions. I've explained the importance of taking all medicatons as prescribed, follow up, and return precuations,  All questions answered.    Please note that this report has been produced using speech recognition software and may contain errors related to that system including, but not limited to, errors in grammar, punctuation, and spelling, as well as words and phrases that possibly may have been recognized inappropriately.  If there are any questions or concerns, contact the dictating provider for clarification.       MDM      Patient presents with flulike symptoms.  Positive fevers, body aches, sinus congestion, cough and malaise.  Positive sick contacts at home.  No respiratory distress, oxygen saturation within normal limits for the patient.  Patient is febrile but is nontoxic and does not meet SIRS criteria.  Tolerating oral intake, does not appear clinically dehydrated. Influenza test is positive. Not immunocompromised. No underlying pulmonary  diseases.  Recommend follow-up with PCP as needed. Counseled on supportive care. Return to ED precautions discussed with the patient          Medical Decision Making      Disposition and Plan     Clinical Impression:  1. Influenza         Disposition:  Discharge  3/9/2025  3:00 pm    Follow-up:  Cassy Olson MD  [address] [place] [address]  [phone]                Medications Prescribed:  Discharge Medication List as of 3/9/2025  3:04 PM              Supplementary Documentation:

## 2025-04-17 ENCOUNTER — TELEPHONE (OUTPATIENT)
Facility: CLINIC | Age: 35
End: 2025-04-17

## 2025-04-17 NOTE — TELEPHONE ENCOUNTER
Spoke with patient. Having pain over her left ovary with past 3 cycles. Also spotting between periods. She thinks she may have an ovarian cyst. Patient last seen 2/2024. Appointment scheduled tomorrow with Shahrzad in the [place] office for evaluation. Verbalized understanding.

## 2025-04-18 ENCOUNTER — OFFICE VISIT (OUTPATIENT)
Dept: OBGYN CLINIC | Facility: CLINIC | Age: 35
End: 2025-04-18
Payer: COMMERCIAL

## 2025-04-18 VITALS
DIASTOLIC BLOOD PRESSURE: 86 MMHG | WEIGHT: 160 LBS | SYSTOLIC BLOOD PRESSURE: 122 MMHG | BODY MASS INDEX: 32.25 KG/M2 | HEART RATE: 75 BPM | HEIGHT: 59 IN

## 2025-04-18 DIAGNOSIS — R10.2 ADNEXAL PAIN: ICD-10-CM

## 2025-04-18 DIAGNOSIS — Z01.411 ENCOUNTER FOR WELL WOMAN EXAM WITH ABNORMAL FINDINGS: Primary | ICD-10-CM

## 2025-04-18 DIAGNOSIS — Z12.4 CERVICAL CANCER SCREENING: ICD-10-CM

## 2025-04-18 PROCEDURE — 87624 HPV HI-RISK TYP POOLED RSLT: CPT

## 2025-04-18 PROCEDURE — 88175 CYTOPATH C/V AUTO FLUID REDO: CPT

## 2025-04-18 PROCEDURE — 99395 PREV VISIT EST AGE 18-39: CPT

## 2025-04-18 PROCEDURE — 99459 PELVIC EXAMINATION: CPT

## 2025-04-18 NOTE — PROGRESS NOTES
Vilma Carter is a 34 year old female  Patient's last menstrual period was 2025.   Chief Complaint   Patient presents with    Wellness Visit    Other     Reviewed Preventative/Wellness form with patient  No to student.    Gyn Problem     Pt c/o irregular  and heavy bleeding 3 months.   .  PCP: Dr. Olson   Last Pap:  NILM, HPV neg     She has been having LLQ for the past three months. It does coincide with her menses, but she never had this pain before.   She denies she is constipated, has regular BM.     OBSTETRICS HISTORY:  OB History    Para Term  AB Living   2 2 1 1  2   SAB IAB Ectopic Multiple Live Births      0 2      # Outcome Date GA Lbr Abraham/2nd Weight Sex Type Anes PTL Lv   2 Term 23 39w0d 04:28 / 00:40 7 lb 2.3 oz (3.24 kg) M NORMAL SPONT EPI N LAZARO   1  20 35w0d  4 lb 14 oz (2.211 kg) F NORMAL SPONT   LAZARO      Complications: Preeclampsia      Obstetric Comments   Pre-eclampsia and postpartum hemorrhage with 1st delivery-needed blood transfusion       GYNE HISTORY:  Periods regular monthly    History   Sexual Activity    Sexual activity: Yes    Partners: Male    Birth control/ protection: I.U.D.        Pap Date: 22  Pap Result Notes: neg/neg        MEDICAL HISTORY:  Past Medical History[1]    SURGICAL HISTORY:  Past Surgical History[2]    SOCIAL HISTORY:  Social History     Socioeconomic History    Marital status:      Spouse name: Not on file    Number of children: Not on file    Years of education: Not on file    Highest education level: Not on file   Occupational History    Not on file   Tobacco Use    Smoking status: Never    Smokeless tobacco: Never   Vaping Use    Vaping status: Never Used   Substance and Sexual Activity    Alcohol use: Never    Drug use: Never    Sexual activity: Yes     Partners: Male     Birth control/protection: I.U.D.   Other Topics Concern    Not on file   Social History Narrative    Not on file     Social  Drivers of Health     Food Insecurity: No Food Insecurity (4/23/2023)    Food Insecurity     Food Insecurity: Never true   Transportation Needs: No Transportation Needs (4/23/2023)    Transportation Needs     Lack of Transportation: No   Stress: No Stress Concern Present (4/23/2023)    Stress     Feeling of Stress : No   Housing Stability: At Risk (8/18/2023)    Received from [facility]     Living Situation: Not on file     Housing Problems: Not on file       FAMILY HISTORY:  Family History[3]    MEDICATIONS:  Medications - Current[4]    ALLERGIES:  Allergies[5]      Review of Systems:  Constitutional:  Denies fatigue, night sweats, hot flashes  Eyes:  denies blurred or double vision  Cardiovascular:  denies chest pain or palpitations  Respiratory:  denies shortness of breath  Gastrointestinal:  denies heartburn, abdominal pain, diarrhea or constipation  Genitourinary:  denies dysuria, incontinence, abnormal vaginal discharge, vaginal itching  Musculoskeletal:  denies back pain.  Skin/Breast:  Denies any breast pain, lumps, or discharge.   Neurological:  denies headaches, extremity weakness or numbness.  Psychiatric: denies depression or anxiety.  Endocrine:   denies excessive thirst or urination.  Heme/Lymph:  denies history of anemia, easy bruising or bleeding.      PHYSICAL EXAM:   Constitutional: well developed, well nourished  Head/Face: normocephalic  Neck/Thyroid: thyroid symmetric, no thyromegaly, no nodules, no adenopathy  Lymphatic:no abnormal supraclavicular or axillary adenopathy is noted  Breast: normal without palpable masses, tenderness, asymmetry, nipple discharge, nipple retraction or skin changes  Abdomen:  soft, nontender, nondistended, no masses  Skin/Hair: no unusual rashes or bruises  Extremities: no edema, no cyanosis  Psychiatric:  Oriented to time, place, person and situation. Appropriate mood and affect    Pelvic Exam:  External Genitalia: normal appearance, hair  distribution, and no lesions  Urethral Meatus:  normal in size, location, without lesions and prolapse  Bladder:  No fullness, masses or tenderness  Vagina:  Normal appearance without lesions, no abnormal discharge  Cervix:  Normal without tenderness on motion, IUD strings seen   Uterus: normal in size, contour, position, mobility, without tenderness  Adnexa: normal without masses. Has LLQ tenderness  Perineum: normal  Anus: no hemorroids     Assessment & Plan:  Diagnoses and all orders for this visit:    Encounter for well woman exam with abnormal findings    Cervical cancer screening  -     Hpv High Risk , Thin Prep Collect; Future  -     ThinPrep PAP Smear B; Future    Adnexal pain  -     US PELVIS W EV (CPT=76856/26140); Future                   [1]   Past Medical History:   Anemia    Arthritis    Asthma (HCC)    Celiac disease (HCC)    Dysmenorrhea    Fibromyalgia    History of blood transfusion    after last delivery    Hx of preeclampsia, prior pregnancy, currently pregnant (HCC)    Irregular menses    Pregnancy-induced hypertension (HCC)    pre-eclampsia with last pregnancy    Rheumatoid arthritis (HCC)   [2]   Past Surgical History:  Procedure Laterality Date    Removal gallbladder      09/2020 2 weeks post delivery    [place]ic[place] teeth removed N/A    [3]   Family History  Problem Relation Age of Onset    Diabetes Father     Hypertension Father     Lipids Father     Hypertension Mother     Lipids Mother     No Known Problems Daughter     No Known Problems Son     Breast Cancer Maternal Grandmother         dx age 60s    Thyroid disease Maternal Grandmother     Anemia Maternal Grandmother     No Known Problems Maternal Grandfather     No Known Problems Paternal Grandmother     Cancer Paternal Grandfather         Kidney dx age 70s    Uterine Cancer Neg     Ovarian Cancer Neg     Pancreatic Cancer Neg     Colon Cancer Neg     Prostate Cancer Neg     Infertility Neg     Endometriosis Neg    [4]   Current Outpatient  Medications:     PARAGARD INTRAUTERINE COPPER IU, by Intrauterine route., Disp: , Rfl:     hydroxychloroquine 200 MG Oral Tab, TAKE 2 TABS DAILY, ALTERNATING WITH 1 TAB DAILY ORALLY ONCE TO TWICE DAILY 60 DAYS, Disp: , Rfl:     Upadacitinib (RINVOQ OR), Take by mouth., Disp: , Rfl:     albuterol 108 (90 Base) MCG/ACT Inhalation Aero Soln, Inhale into the lungs every 6 (six) hours as needed for Wheezing., Disp: , Rfl:     fluticasone furoate-vilanterol (BREO ELLIPTA) 200-25 MCG/ACT Inhalation Aerosol Powder, Breath Activated, Inhale 1 puff into the lungs daily., Disp: , Rfl:     methotrexate 2.5 MG Oral Tab, Take 1 tablet (2.5 mg total) by mouth once a week. (Patient not taking: Reported on 4/18/2025), Disp: , Rfl:     Norethindrone (ORTHO MICRONOR) 0.35 MG Oral Tab, Take 1 tablet (0.35 mg total) by mouth daily. (Patient not taking: Reported on 4/18/2025), Disp: 28 tablet, Rfl: 11    Montelukast Sodium (SINGULAIR OR), Take by mouth., Disp: , Rfl:   [5]   Allergies  Allergen Reactions    Bactrim [Sulfamethoxazole W/Trimethoprim] HIVES    Eggs Or Egg-Derived Products HIVES    Nsaids HIVES and OTHER (SEE COMMENTS)     Was told by PMD - can tolerate ibuprofen + diphenhydramine    Tomatoes NAUSEA AND VOMITING    Bactrim Ds RASH    Gluten Flour DIARRHEA    Lactose Intolerant DIARRHEA and RASH

## 2025-04-21 LAB — HPV E6+E7 MRNA CVX QL NAA+PROBE: NEGATIVE

## 2025-04-30 ENCOUNTER — HOSPITAL ENCOUNTER (OUTPATIENT)
Dept: ULTRASOUND IMAGING | Age: 35
Discharge: HOME | End: 2025-04-30
Payer: COMMERCIAL

## 2025-04-30 DIAGNOSIS — R10.2 ADNEXAL PAIN: ICD-10-CM

## 2025-04-30 PROCEDURE — 76830 TRANSVAGINAL US NON-OB: CPT

## 2025-04-30 PROCEDURE — 76856 US EXAM PELVIC COMPLETE: CPT

## 2025-05-02 ENCOUNTER — TELEPHONE (OUTPATIENT)
Facility: CLINIC | Age: 35
End: 2025-05-02

## 2025-05-02 NOTE — PROGRESS NOTES
Patient aware of US results and recommends a follow up pelvic US in two to three months to revaluate the left ovarian cyst. Patient scheduled. Patient verbalized understanding.

## (undated) NOTE — LETTER
20      Re: Elizabeth Alvares    : 1990      To Whom It May Concern:      Elizabeth Alvares is a patient of mine under care my care for pregnancy, Due to issues related to the       pregnancy, Marie Bruno should be allowed to continue to work from home.  Dallin Tavarez

## (undated) NOTE — LETTER
[facility] [place] Department of OB/GYN  After Care Instructions for IUD      Bleeding   You may experience irregular bleeding for the fist 3-6 months.  If your bleeding becomes heavier than a normal menstrual cycle, please contact our office.    Pain  You may experience mild menstrual cramping after the IUD insertion that will typically last 24-48hrs.  You may take Ibuprofen, Tylenol or Aleve to relieve the discomfort.  If you experience severe or persistent pain, please contact our office.       Restrictions    You should avoid sexual intercourse or tampon use for 1 day after insertion.  Please use a backup method of contraception for 4-7 days with the Mirena and Ammy.    When to contact our office  If you are experiencing discomfort described as worse than menstrual cramps that is not relieved by ibuprofen   Fever of 100.4 or greater  Vaginal bleeding that is saturating 1 pad per hour    Any discomfort or poking sensation during intercourse or other sexual activity      Follow up in 4-6 weeks for IUD check.   If you have additional questions or concerns, please call us at [phone].

## (undated) NOTE — LETTER
23          RE:  Kenroy Ellington  :  1990      To Whom It May Concern:    Kenroy Ellington  is currently under our care for pregnancy. It is permissible at her gestational age for dental work to be performed. However, if x-rays are needed, please make sure that the abdomen is shielded appropriately, and thoroughly. Analgesia is also permissible, as long as there is no vasoconstrictor used. For post-treatment pain relief, Tylenol alone is acceptable. If an antibiotic is needed, a penicillin derivative may be used. If you have any additional concerns, do not hesitate to contact our office.     Sincerely,   Dr Carreon Oven

## (undated) NOTE — LETTER
20    Re: Jas Rao  : 1990      To Whom It May Concern:  Jas Rao is a patient of mine and is seeking her prenatal care from our office.  Due to unforeseen pregnancy related issues, Homar Sarkar should be allowed to work from home as much

## (undated) NOTE — LETTER
Vilma Carter :1990  CONSENT FOR PROCEDURE/SEDATION  1. I authorize the performance upon Vilma Carter  the following: Insertion Intrauterine Device    2. I authorize Dr. Rebekah Munroe,  (and whomever is designated as the doctor’s assistant), to perform the above-mentioned procedures.    3. If any unforeseen conditions arise during this procedure calling for additional  procedures, operations, or medications (including anesthesia and blood transfusion), I further request and authorize the doctor to do whatever he/she deems advisable in my interest.    4. I consent to the taking and reproduction of any photographs in the course of this procedure for professional purposes.    5. I consent to the administration of such sedation as may be considered necessary or advisable by the physician responsible for this service, with the exception of ______________________________________________________    6. I have been informed by my doctor of the nature and purpose of this procedure sedation, possible alternative methods of treatment, risk involved and possible complications.    7. If I have a Do Not Resuscitate (DNR) order in place, the physician and I (or the individual authorized to consent on my behalf) will discuss and agree as to whether the Do Not Resuscitate (DNR) order will remain in effect or will be discontinued during the performance of the procedure and the applicable recovery period. If the Do Not Resuscitate (DNR) order is discontinued and is to be reinstated following the procedure/recovery period, the physician will determine when the applicable recovery period ends for purposes of reinstating the Do Not Resuscitate (DNR) order.    Signature of Patient:_______________________________________________    Signature of person authorized to consent for patient:  _______________________________________________________________    Relationship to patient:  ____________________________________________    Witness: _________________________________________ Date:___________     Physician Signature: _______________________________ Date:___________

## (undated) NOTE — LETTER
Obstetrics and Gynecology         Dr. Adis Wood, [address], [facility], [address] [address] [phone]           8/28/2020    Regarding: Elizabeth Alvares    Date of Birth: [birth year]

## (undated) NOTE — ED AVS SNAPSHOT
Riddhi Damon   MRN: [id number]    Department:  [facility] Emergency Department   Date of Visit:  10/27/2019           Disclosure     Insurance plans vary and the physician(s) referred by the ER may not be covered by your plan.  Please contact you tell this physician (or your personal doctor if your instructions are to return to your personal doctor) about any new or lasting problems. The primary care or specialist physician will see patients referred from the [facility] Emergency Department.  Jozef Crockett

## (undated) NOTE — LETTER
Obstetrics and Gynecology         Dr. Mara Mcgee, [address], [facility] [address], [address] [address] [phone]           3/4/3020    Regarding: Carlos Gonsalez    Date of birth: [birth year]